# Patient Record
Sex: MALE | Race: WHITE | NOT HISPANIC OR LATINO | Employment: UNEMPLOYED | URBAN - METROPOLITAN AREA
[De-identification: names, ages, dates, MRNs, and addresses within clinical notes are randomized per-mention and may not be internally consistent; named-entity substitution may affect disease eponyms.]

---

## 2018-08-01 ENCOUNTER — OFFICE VISIT (OUTPATIENT)
Dept: URGENT CARE | Facility: URGENT CARE | Age: 23
End: 2018-08-01
Payer: COMMERCIAL

## 2018-08-01 ENCOUNTER — RADIANT APPOINTMENT (OUTPATIENT)
Dept: GENERAL RADIOLOGY | Facility: CLINIC | Age: 23
End: 2018-08-01
Attending: FAMILY MEDICINE
Payer: COMMERCIAL

## 2018-08-01 VITALS
TEMPERATURE: 98.7 F | HEART RATE: 95 BPM | SYSTOLIC BLOOD PRESSURE: 146 MMHG | RESPIRATION RATE: 18 BRPM | OXYGEN SATURATION: 97 % | DIASTOLIC BLOOD PRESSURE: 89 MMHG | WEIGHT: 315 LBS

## 2018-08-01 DIAGNOSIS — Z72.0 TOBACCO ABUSE: ICD-10-CM

## 2018-08-01 DIAGNOSIS — R06.02 SOB (SHORTNESS OF BREATH): ICD-10-CM

## 2018-08-01 DIAGNOSIS — M79.89 SWOLLEN FEET: Primary | ICD-10-CM

## 2018-08-01 DIAGNOSIS — R05.8 PRODUCTIVE COUGH: ICD-10-CM

## 2018-08-01 DIAGNOSIS — R05.9 COUGH: ICD-10-CM

## 2018-08-01 LAB
ALBUMIN SERPL-MCNC: 3.8 G/DL (ref 3.4–5)
ALP SERPL-CCNC: 64 U/L (ref 40–150)
ALT SERPL W P-5'-P-CCNC: 58 U/L (ref 0–70)
ANION GAP SERPL CALCULATED.3IONS-SCNC: 7 MMOL/L (ref 3–14)
AST SERPL W P-5'-P-CCNC: 26 U/L (ref 0–45)
BASOPHILS # BLD AUTO: 0 10E9/L (ref 0–0.2)
BASOPHILS NFR BLD AUTO: 0.2 %
BILIRUB SERPL-MCNC: 0.2 MG/DL (ref 0.2–1.3)
BUN SERPL-MCNC: 14 MG/DL (ref 7–30)
CALCIUM SERPL-MCNC: 8.7 MG/DL (ref 8.5–10.1)
CHLORIDE SERPL-SCNC: 109 MMOL/L (ref 94–109)
CO2 SERPL-SCNC: 27 MMOL/L (ref 20–32)
CREAT SERPL-MCNC: 0.9 MG/DL (ref 0.66–1.25)
DIFFERENTIAL METHOD BLD: NORMAL
EOSINOPHIL # BLD AUTO: 0.2 10E9/L (ref 0–0.7)
EOSINOPHIL NFR BLD AUTO: 1.8 %
ERYTHROCYTE [DISTWIDTH] IN BLOOD BY AUTOMATED COUNT: 13.4 % (ref 10–15)
GFR SERPL CREATININE-BSD FRML MDRD: >90 ML/MIN/1.7M2
GLUCOSE SERPL-MCNC: 115 MG/DL (ref 70–99)
HCT VFR BLD AUTO: 44.8 % (ref 40–53)
HGB BLD-MCNC: 14.7 G/DL (ref 13.3–17.7)
LYMPHOCYTES # BLD AUTO: 2.1 10E9/L (ref 0.8–5.3)
LYMPHOCYTES NFR BLD AUTO: 19.1 %
MCH RBC QN AUTO: 29.1 PG (ref 26.5–33)
MCHC RBC AUTO-ENTMCNC: 32.8 G/DL (ref 31.5–36.5)
MCV RBC AUTO: 89 FL (ref 78–100)
MONOCYTES # BLD AUTO: 0.8 10E9/L (ref 0–1.3)
MONOCYTES NFR BLD AUTO: 7.2 %
NEUTROPHILS # BLD AUTO: 7.8 10E9/L (ref 1.6–8.3)
NEUTROPHILS NFR BLD AUTO: 71.7 %
NT-PROBNP SERPL-MCNC: 10 PG/ML (ref 0–125)
PLATELET # BLD AUTO: 226 10E9/L (ref 150–450)
POTASSIUM SERPL-SCNC: 4 MMOL/L (ref 3.4–5.3)
PROT SERPL-MCNC: 7.8 G/DL (ref 6.8–8.8)
RBC # BLD AUTO: 5.06 10E12/L (ref 4.4–5.9)
SODIUM SERPL-SCNC: 143 MMOL/L (ref 133–144)
WBC # BLD AUTO: 10.9 10E9/L (ref 4–11)

## 2018-08-01 PROCEDURE — 83880 ASSAY OF NATRIURETIC PEPTIDE: CPT | Performed by: FAMILY MEDICINE

## 2018-08-01 PROCEDURE — 85025 COMPLETE CBC W/AUTO DIFF WBC: CPT | Performed by: FAMILY MEDICINE

## 2018-08-01 PROCEDURE — 99204 OFFICE O/P NEW MOD 45 MIN: CPT | Performed by: FAMILY MEDICINE

## 2018-08-01 PROCEDURE — 80053 COMPREHEN METABOLIC PANEL: CPT | Performed by: FAMILY MEDICINE

## 2018-08-01 PROCEDURE — 36415 COLL VENOUS BLD VENIPUNCTURE: CPT | Performed by: FAMILY MEDICINE

## 2018-08-01 PROCEDURE — 71046 X-RAY EXAM CHEST 2 VIEWS: CPT | Mod: FY

## 2018-08-01 RX ORDER — AZITHROMYCIN 250 MG/1
TABLET, FILM COATED ORAL
Qty: 6 TABLET | Refills: 0 | Status: SHIPPED | OUTPATIENT
Start: 2018-08-01 | End: 2018-08-06

## 2018-08-01 NOTE — MR AVS SNAPSHOT
"              After Visit Summary   2018    Adin Meade    MRN: [id number]           Patient Information     Date Of Birth          1995        Visit Information        Provider Department      2018 11:15 AM Omar Solo,  [facility]        Today's Diagnoses     Swollen feet    -  1    Cough        SOB (shortness of breath)        Productive cough        Tobacco abuse           Follow-ups after your visit        Who to contact     If you have questions or need follow up information about today's clinic visit or your schedule please contact [facility] directly at [phone].  Normal or non-critical lab and imaging results will be communicated to you by [employer], letter or phone within 4 business days after the clinic has received the results. If you do not hear from us within 7 days, please contact the clinic through [employer] or phone. If you have a critical or abnormal lab result, we will notify you by phone as soon as possible.  Submit refill requests through [employer] or call your pharmacy and they will forward the refill request to us. Please allow 3 business days for your refill to be completed.          Additional Information About Your Visit        MyChart Information     [employer] lets you send messages to your doctor, view your test results, renew your prescriptions, schedule appointments and more. To sign up, go to [email or web address] . Click on \"Log in\" on the left side of the screen, which will take you to the Welcome page. Then click on \"Sign up Now\" on the right side of the page.     You will be asked to enter the access code listed below, as well as some personal information. Please follow the directions to create your username and password.     Your access code is: [email or web address]  Expires: 10/30/2018  1:04 PM     Your access code will  in 90 days. If you need help or a new code, please call your [place] clinic or " [phone].        Care EveryWhere ID     This is your Care EveryWhere ID. This could be used by other organizations to access your [place] medical records  [id number]        Your Vitals Were     Pulse Temperature Respirations Pulse Oximetry          95 98.7  F (37.1  C) (Oral) 18 97%         Blood Pressure from Last 3 Encounters:   08/01/18 146/89    Weight from Last 3 Encounters:   08/01/18 (!) 394 lb (178.7 kg)              We Performed the Following     BNP-N terminal pro     CBC with platelets differential     Comprehensive metabolic panel (BMP + Alb, Alk Phos, ALT, AST, Total. Bili, TP)     XR Chest 2 Views          Today's Medication Changes          These changes are accurate as of 8/1/18  1:04 PM.  If you have any questions, ask your nurse or doctor.               Start taking these medicines.        Dose/Directions    azithromycin 250 MG tablet   Commonly known as:  ZITHROMAX   Used for:  Productive cough   Started by:  Omar Solo, DO        Two tablets first day, then one tablet daily for four days.   Quantity:  6 tablet   Refills:  0            Where to get your medicines      These medications were sent to [facility] [id number] [place] [address] [address] AT [facility] [address]  [address] [facility] [address]     Phone:  [phone]     azithromycin 250 MG tablet                Primary Care Provider Fax #    Physician No Ref-Primary [phone]       No address on file        Equal Access to Services     MATTY CHRISTIANSON : Hadii manuelito doss Soharjinder, waaxda luqadaha, qaybta kaalmada aderegineyada, suzan reyes. So [facility] [phone].    ATENCIÓN: Si habla español, tiene a carbajal disposición servicios gratuitos de asistencia lingüística. Nicholame al [phone].    We comply with applicable federal civil rights laws and [place] laws. We do not discriminate on the basis of race, color, national origin, age, disability, sex, sexual orientation, or  gender identity.            Thank you!     Thank you for choosing [facility]  for your care. Our goal is always to provide you with excellent care. Hearing back from our patients is one way we can continue to improve our services. Please take a few minutes to complete the written survey that you may receive in the mail after your visit with us. Thank you!             Your Updated Medication List - Protect others around you: Learn how to safely use, store and throw away your medicines at www.disposemymeds.org.          This list is accurate as of 8/1/18  1:04 PM.  Always use your most recent med list.                   Brand Name Dispense Instructions for use Diagnosis    azithromycin 250 MG tablet    ZITHROMAX    6 tablet    Two tablets first day, then one tablet daily for four days.    Productive cough

## 2018-08-01 NOTE — PROGRESS NOTES
SUBJECTIVE: Adin Meade is a 23 year old male presenting with a chief complaint of cough for weeks along with chronic lower ext edema.  Onset of symptoms was week(s) ago.  Course of illness is same.    Severity moderate  Current and Associated symptoms: SOB  Treatment measures tried include None tried.  Predisposing factors include obesity.    Past Medical History:   Diagnosis Date     Obesity        Past Surgical History:   Procedure Laterality Date     NO HISTORY OF SURGERY         Family History   Problem Relation Age of Onset     KIDNEY DISEASE Paternal Grandmother      Cancer Paternal Grandfather        Social History   Substance Use Topics     Smoking status: Current Every Day Smoker     Smokeless tobacco: Never Used     Alcohol use Yes      Comment: 1 during weekend      Review Of Systems  Skin: negative  Eyes: negative  Ears/Nose/Throat: negative  Respiratory: Cough- productive  Cardiovascular: negative  Gastrointestinal: negative  Genitourinary: negative  Musculoskeletal: negative  Neurologic: negative  Psychiatric: negative  Hematologic/Lymphatic/Immunologic: negative  Endocrine: negative      OBJECTIVE:  /89  Pulse 95  Temp 98.7  F (37.1  C) (Oral)  Resp 18  Wt (!) 394 lb (178.7 kg)  SpO2 97%GENERAL APPEARANCE: healthy, alert and no distress  EYES: EOMI,  PERRL, conjunctiva clear  HENT: ear canals and TM's normal.  Nose and mouth without ulcers, erythema or lesions  NECK: supple, nontender, no lymphadenopathy  RESP: lungs clear to auscultation - no rales, rhonchi or wheezes  CV: regular rates and rhythm, normal S1 S2, no murmur noted  ABDOMEN:  soft, nontender, no HSM or masses and bowel sounds normal  NEURO: Normal strength and tone, sensory exam grossly normal,  normal speech and mentation  SKIN: no suspicious lesions or rashes  Bilateral pitting edema lower ext      ICD-10-CM    1. Swollen feet M79.89 CBC with platelets differential     Comprehensive metabolic panel (BMP + Alb, Alk Phos,  ALT, AST, Total. Bili, TP)     BNP-N terminal pro     XR Chest 2 Views   2. Cough R05 CBC with platelets differential     Comprehensive metabolic panel (BMP + Alb, Alk Phos, ALT, AST, Total. Bili, TP)     BNP-N terminal pro     XR Chest 2 Views   3. SOB (shortness of breath) R06.02 CBC with platelets differential     Comprehensive metabolic panel (BMP + Alb, Alk Phos, ALT, AST, Total. Bili, TP)     BNP-N terminal pro     XR Chest 2 Views   4. Productive cough R05 azithromycin (ZITHROMAX) 250 MG tablet   5. Tobacco abuse Z72.0 CBC with platelets differential     Comprehensive metabolic panel (BMP + Alb, Alk Phos, ALT, AST, Total. Bili, TP)     BNP-N terminal pro     XR Chest 2 Views     cc BNP  Tests done today to r/o Pneumonia/kidney/liver issues as a cause for edema/cough/SOB as well as BNP to r/u CHF  Elevated legs, compression stockings, low salt diet wt loss  Will need recheck/f/u clinic.    Pt will call for f/u appointment today

## 2020-07-09 ENCOUNTER — OFFICE VISIT (OUTPATIENT)
Dept: FAMILY MEDICINE | Facility: CLINIC | Age: 25
End: 2020-07-09
Payer: MEDICAID

## 2020-07-09 VITALS
HEART RATE: 80 BPM | BODY MASS INDEX: 41.75 KG/M2 | DIASTOLIC BLOOD PRESSURE: 83 MMHG | SYSTOLIC BLOOD PRESSURE: 137 MMHG | OXYGEN SATURATION: 98 % | TEMPERATURE: 97.9 F | WEIGHT: 315 LBS | HEIGHT: 73 IN

## 2020-07-09 DIAGNOSIS — L72.3 SEBACEOUS CYST: Primary | ICD-10-CM

## 2020-07-09 DIAGNOSIS — L05.01 PILONIDAL CYST WITH ABSCESS: ICD-10-CM

## 2020-07-09 PROCEDURE — 10060 I&D ABSCESS SIMPLE/SINGLE: CPT | Performed by: FAMILY MEDICINE

## 2020-07-09 RX ORDER — DOXYCYCLINE 100 MG/1
100 CAPSULE ORAL 2 TIMES DAILY
Qty: 20 CAPSULE | Refills: 0 | Status: SHIPPED | OUTPATIENT
Start: 2020-07-09 | End: 2020-07-19

## 2020-07-09 ASSESSMENT — MIFFLIN-ST. JEOR: SCORE: 2727.54

## 2020-07-09 ASSESSMENT — PAIN SCALES - GENERAL: PAINLEVEL: MODERATE PAIN (4)

## 2020-07-09 NOTE — PROGRESS NOTES
Subjective     Adin Meade is a 25 year old male who presents to clinic today for the following health issues:    HPI   Patient with history of sebaceous cyst in the past.  He reports in the past 2 weeks he has been having a cyst behind his right ear inflamed tender.  Sometimes it does leak.  Otherwise no fever no chills.    Patient does have history of pilonidal cyst been present for years.  He reports sometimes does get inflamed tendon.  He would like to have it removed.    Patient is present to day for possible cyst on the back of both ears. He stated that it feels tender.    Reviewed and updated as needed this visit by Provider         Review of Systems   Constitutional, HEENT, cardiovascular, pulmonary, gi and gu systems are negative, except as otherwise noted.      Objective    There were no vitals taken for this visit.  There is no height or weight on file to calculate BMI.  Physical Exam   GENERAL: healthy, alert and no distress  MS: no gross musculoskeletal defects noted, no edema  SKIN: Behind the right ear he has a sebaceous cyst, 1 cm, by half a centimeter.  Inflamed and tender.  At his anal area he does have pilonidal cyst, inflamed and tender.    PSYCH: mentation appears normal, affect normal/bright    Diagnostic Test Results:  Labs reviewed in Epic  none         Assessment & Plan       ICD-10-CM    1. Sebaceous cyst  L72.3 doxycycline hyclate (VIBRAMYCIN) 100 MG capsule     DRAIN SKIN ABSCESS SIMPLE/SINGLE   2. Pilonidal cyst with abscess  L05.01 GENERAL SURG ADULT REFERRAL     After I obtained a consent from the patient.  The area of his right, behind ear cyst was cleaned with alcohol swab.  Then I used half a cc of 1% lidocaine.  Then a 15 inch blade made a horizontal incision.  I squeezed a small amount of bloody, greenish fluid.  Patient tolerated the procedure well.  Then covered with a pressure- guaze.    He was given doxycycline to treat his pilonidal cyst, in addition to help with  "inflammation of his sebaceous cyst.  In addition, he was referred to a general surgeon to have his pilonidal cyst completely removed.    BMI:   Estimated body mass index is 48.64 kg/m  as calculated from the following:    Height as of this encounter: 1.865 m (6' 1.43\").    Weight as of this encounter: 169.2 kg (373 lb).   Weight management plan: Discussed healthy diet and exercise guidelines        There are no Patient Instructions on file for this visit.    Return in about 6 months (around 1/9/2021) for Physical Exam.    Fernanda Segovia MD  [facility]    "

## 2020-09-08 ENCOUNTER — OFFICE VISIT (OUTPATIENT)
Dept: SURGERY | Facility: CLINIC | Age: 25
End: 2020-09-08
Payer: MEDICAID

## 2020-09-08 ENCOUNTER — TELEPHONE (OUTPATIENT)
Dept: SURGERY | Facility: CLINIC | Age: 25
End: 2020-09-08

## 2020-09-08 VITALS
SYSTOLIC BLOOD PRESSURE: 153 MMHG | HEART RATE: 70 BPM | HEIGHT: 74 IN | BODY MASS INDEX: 40.43 KG/M2 | WEIGHT: 315 LBS | DIASTOLIC BLOOD PRESSURE: 87 MMHG

## 2020-09-08 DIAGNOSIS — L05.01 PILONIDAL CYST WITH ABSCESS: ICD-10-CM

## 2020-09-08 DIAGNOSIS — L08.9 INFECTED SEBACEOUS CYST OF SKIN: Primary | ICD-10-CM

## 2020-09-08 DIAGNOSIS — L72.3 INFECTED SEBACEOUS CYST OF SKIN: Primary | ICD-10-CM

## 2020-09-08 DIAGNOSIS — E66.01 MORBID OBESITY (H): ICD-10-CM

## 2020-09-08 PROCEDURE — 99203 OFFICE O/P NEW LOW 30 MIN: CPT | Performed by: SURGERY

## 2020-09-08 RX ORDER — DOXYCYCLINE HYCLATE 100 MG
100 TABLET ORAL 2 TIMES DAILY
Qty: 20 TABLET | Refills: 1 | Status: ON HOLD | OUTPATIENT
Start: 2020-09-08 | End: 2022-01-11

## 2020-09-08 ASSESSMENT — MIFFLIN-ST. JEOR: SCORE: 2777.49

## 2020-09-08 NOTE — LETTER
"    9/8/2020         RE: Adin Meade  [address]  [facility] [facility] [address]        Dear Colleague,    Thank you for referring your patient, Adin Meade, to the [facility]. Please see a copy of my visit note below.    Dear Dr. Segovia    I have seen Adin Meade and as you know his chief complaint is pilonidal cyst that is draining   Has had for 2 years.  minimal pain today.     HPI:  Patient is a 25 year old male  with complaints see above  The patient noticed the symptoms about 2 years ago.    Patient has not family history of hernia problems  Laying on his side makes the episode better.      Review Of Systems  Respiratory: No shortness of breath, dyspnea on exertion, cough, or hemoptysis  Cardiovascular: negative  Gastrointestinal: negative  Endocrine: negative  :  negative    10 Point review of systems all others are negative.   BP (!) 153/87   Pulse 70   Ht 1.88 m (6' 2\")   Wt (!) 173.3 kg (382 lb)   BMI 49.05 kg/m      Past Medical History:   Diagnosis Date     Obesity        Past Surgical History:   Procedure Laterality Date     NO HISTORY OF SURGERY         Social History     Socioeconomic History     Marital status: Single     Spouse name: Not on file     Number of children: Not on file     Years of education: Not on file     Highest education level: Not on file   Occupational History     Not on file   Social Needs     Financial resource strain: Not on file     Food insecurity     Worry: Not on file     Inability: Not on file     Transportation needs     Medical: Not on file     Non-medical: Not on file   Tobacco Use     Smoking status: Current Every Day Smoker     Smokeless tobacco: Never Used   Substance and Sexual Activity     Alcohol use: Yes     Comment: 1 during weekend      Drug use: Yes     Types: Marijuana     Comment: sometimes      Sexual activity: Yes     Partners: Female   Lifestyle     Physical activity     Days per week: Not on file     Minutes per session: Not on " "file     Stress: Not on file   Relationships     Social connections     Talks on phone: Not on file     Gets together: Not on file     Attends [employer] service: Not on file     Active member of club or organization: Not on file     Attends meetings of clubs or organizations: Not on file     Relationship status: Not on file     Intimate partner violence     Fear of current or ex partner: Not on file     Emotionally abused: Not on file     Physically abused: Not on file     Forced sexual activity: Not on file   Other Topics Concern     Parent/sibling w/ CABG, MI or angioplasty before 65F 55M? Not Asked   Social History Narrative     Not on file       No current outpatient medications on file.       Above was reviewed  Physical exam: BP (!) 153/87   Pulse 70   Ht 1.88 m (6' 2\")   Wt (!) 173.3 kg (382 lb)   BMI 49.05 kg/m     Patient able to get up on table without difficulty.   Patient is alert and orientated.   Head eyes, nose and mouth within normal limits.  No supraclavicular or cervical adenopathy palpated.  Thyroid within normal limits.  No carotid bruits auscultated.  Lungs are clear to auscultation  Heart is regular rate and rhythm with no murmur or thrills noted.  No costal vertebral angle tenderness noted.  Abdomen is abdomen is soft without significant tenderness, masses, organomegaly or guarding  bowel sounds are positive and no caput medusa noted.  No obvious ventra hernias noted.     Skin was warm and pink  Normal Affect      Lower extremity edema is mildly present.    On the pilonidal cyst area has multiple small pits starting below the the abscess site that is on the left side.     Assessment: pilonidal cyst with tracts   Plan to do will under  General anesthesia  Explore the area with small wires and then remove the pilonidal cyst with tracts and leave open.  .    Risks of surgery include damage to nerves, bleeding, infection, damage to  Vessels, recurrence. Will be left open.     Look on groupon " for laser hair removal.  To be done after this is all healed up.     Time spent with the patient with greater that 50% of the time in discussion was 30 minutes.  In discussing the surgery and after care. .      Tulio Alvarado MD        Again, thank you for allowing me to participate in the care of your patient.        Sincerely,        Tulio Alvarado MD

## 2020-09-08 NOTE — TELEPHONE ENCOUNTER
Left a voice message for patient to call and schedule surgery, please confirm patients insurance     Confirmed surgery date with Richa GUEVARA for 10/05/2020

## 2020-09-08 NOTE — PATIENT INSTRUCTIONS
On the pilonidal cyst area has multiple small pits starting below the the abscess site that is on the left side.     Assessment: pilonidal cyst with tracts   Plan to do will under  General anesthesia  Explore the area with small wires and then remove the pilonidal cyst with tracts and leave open.  .    Risks of surgery include damage to nerves, bleeding, infection, damage to  Vessels, recurrence. Will be left open.     Look on groupon for laser hair removal.  To be done after this is all healed up.       HERNIORRHAPHY DISCHARGE INSTRUCTIONS  DR. BELLE FERGUSON    1. You may resume your regular diet when you feel you are ready to. DO NOT drink alcoholic beverages for 24 hours or while you are taking prescription medication.    2. Limit your activities for the first 48 hours. Gradually, increase them as tolerated. You may use stairs. I encourage you to walk as tolerated.     3. You will have some discomfort at the incision sites. This is expected. This should improve over the next 2-3 days. Ice and pain medication will help with this pain. Use prescribed pain medication as instructed.    4. Bruising and mild swelling is normal after surgery. For males it is common to have bruising going into the penis and scrotum. The area below and around the incision(s) will be hard and elevated. This is normal. I call it the healing ridge. This will resolve slowly over the next several months. If you feel the pain is increasing and cannot explain it by increasing activity please call us at [phone].    5. The dressing will often have some blood on it. You may shower 24 hours after surgery. Clean gently over incision site. If clear plastic covering or steri-strip comes off and there is still some bleeding or drainage then cover with gauze or band-aid. If no bleeding, there is no reason to cover site. The abdominal binder may be removed after 24 hours after surgery. You may continue to wear it however for comfort. I  suggest  you wear an old t-shirt under the abdominal binder for a more comfortable wear.    6. Avoid Aspirin for the first 72 hours after the procedure. This medication may increase the tendency to bleed.    7. Use the following medications (in addition to your normal meds) as shown:  a. Percocet 5 mg 1-2 every 6 hours as needed for severe pain. This contains 325 mg of Tylenol (acetaminophen) per tablet.  Please do not take more than 4 grams of Tylenol (acetaminophen) per day. For example, you may take 1 Percocet and 1 Tylenol, or 2 Percocet and no Tylenol, or 2 Tylenol and no Percocet every 6 hours.  b. Tylenol (acetaminophen) 500 mg every 6 hours as needed for mild pain. Do not take more than 1000 mg every 6 hours. (see above).  c. Motrin (ibuprofen) 200-800 mg every 6 hours as needed for mild to moderate pain. Take with food.     8. Notify Dr. Alvarado's clinic at [phone] if:    Your discomfort is not relieved by your pain medication.    You have signs of infection such as temperature above 100.5 degrees orally, chills, or increasing daily discomfort.    Incision site is becoming more red and/or there is purulent drainage.    You have questions or concerns.    9. Please call [phone] to schedule a follow up appointment in about 2 weeks.    10. When taking narcotics (pain medication more than Tylenol [acetaminophen] and Motrin [ibuprofen]) it is important to keep your stools soft to avoid constipation and pain with straining. This is best done by drinking fluids (non-alcoholic and non-caffeinated) and taking a stool softener (i.e. Metamucil or milk of magnesia). You may be able to use non-narcotics for pain relief especially by the 3rd post- operative day. Tylenol (acetaminophen) 500 mg every 6 hours and/or Motrin (ibuprofen) 200-800 mg every 6 hours. Please do not take more than 4 grams of Tylenol (acetaminophen) per day. Remember your Percocet does have Tylenol (acetaminophen) already in it. Please  take Motrin (ibuprofen) with food to help protect the stomach. If you have a history of stomach ulcers or stomach problems, do not take Motrin (ibuprofen).     11. Do not drive or operate heavy machinery for 24 hours after surgery or when taking narcotics. You may resume driving when feel that you can safely avoid an accident and are not taking narcotics. This is usually 5 to 7 days after surgery. You should not be alone for 24 hours after surgery.    12. Have milk of magnesia available at home so that when you take the pain medications you take 1-2 teaspoons a day,  to help reduce problems with constipation.

## 2020-09-08 NOTE — PROGRESS NOTES
"Dear Dr. Segovia    I have seen Adin DIETER Lawsnd and as you know his chief complaint is pilonidal cyst that is draining   Has had for 2 years.  minimal pain today.     HPI:  Patient is a 25 year old male  with complaints see above  The patient noticed the symptoms about 2 years ago.    Patient has not family history of hernia problems  Laying on his side makes the episode better.      Review Of Systems  Respiratory: No shortness of breath, dyspnea on exertion, cough, or hemoptysis  Cardiovascular: negative  Gastrointestinal: negative  Endocrine: negative  :  negative    10 Point review of systems all others are negative.   BP (!) 153/87   Pulse 70   Ht 1.88 m (6' 2\")   Wt (!) 173.3 kg (382 lb)   BMI 49.05 kg/m      Past Medical History:   Diagnosis Date     Obesity        Past Surgical History:   Procedure Laterality Date     NO HISTORY OF SURGERY         Social History     Socioeconomic History     Marital status: Single     Spouse name: Not on file     Number of children: Not on file     Years of education: Not on file     Highest education level: Not on file   Occupational History     Not on file   Social Needs     Financial resource strain: Not on file     Food insecurity     Worry: Not on file     Inability: Not on file     Transportation needs     Medical: Not on file     Non-medical: Not on file   Tobacco Use     Smoking status: Current Every Day Smoker     Smokeless tobacco: Never Used   Substance and Sexual Activity     Alcohol use: Yes     Comment: 1 during weekend      Drug use: Yes     Types: Marijuana     Comment: sometimes      Sexual activity: Yes     Partners: Female   Lifestyle     Physical activity     Days per week: Not on file     Minutes per session: Not on file     Stress: Not on file   Relationships     Social connections     Talks on phone: Not on file     Gets together: Not on file     Attends Church service: Not on file     Active member of club or organization: Not on file     Attends " "meetings of clubs or organizations: Not on file     Relationship status: Not on file     Intimate partner violence     Fear of current or ex partner: Not on file     Emotionally abused: Not on file     Physically abused: Not on file     Forced sexual activity: Not on file   Other Topics Concern     Parent/sibling w/ CABG, MI or angioplasty before 65F 55M? Not Asked   Social History Narrative     Not on file       No current outpatient medications on file.       Above was reviewed  Physical exam: BP (!) 153/87   Pulse 70   Ht 1.88 m (6' 2\")   Wt (!) 173.3 kg (382 lb)   BMI 49.05 kg/m     Patient able to get up on table without difficulty.   Patient is alert and orientated.   Head eyes, nose and mouth within normal limits.  No supraclavicular or cervical adenopathy palpated.  Thyroid within normal limits.  No carotid bruits auscultated.  Lungs are clear to auscultation  Heart is regular rate and rhythm with no murmur or thrills noted.  No costal vertebral angle tenderness noted.  Abdomen is abdomen is soft without significant tenderness, masses, organomegaly or guarding  bowel sounds are positive and no caput medusa noted.  No obvious ventra hernias noted.     Skin was warm and pink  Normal Affect      Lower extremity edema is mildly present.    On the pilonidal cyst area has multiple small pits starting below the the abscess site that is on the left side.     Assessment: pilonidal cyst with tracts   Plan to do will under  General anesthesia  Explore the area with small wires and then remove the pilonidal cyst with tracts and leave open.  .    Risks of surgery include damage to nerves, bleeding, infection, damage to  Vessels, recurrence. Will be left open.     Look on groupon for laser hair removal.  To be done after this is all healed up.     Time spent with the patient with greater that 50% of the time in discussion was 30 minutes.  In discussing the surgery and after care. .      Tulio Alvarado MD      "

## 2020-09-10 NOTE — TELEPHONE ENCOUNTER
Spoke with patient he is wanting to wait to schedule until December, I have canceled with [facility].

## 2020-12-06 ENCOUNTER — HEALTH MAINTENANCE LETTER (OUTPATIENT)
Age: 25
End: 2020-12-06

## 2021-05-05 ENCOUNTER — HOSPITAL ENCOUNTER (EMERGENCY)
Facility: CLINIC | Age: 26
Discharge: HOME | End: 2021-05-06
Attending: EMERGENCY MEDICINE
Payer: COMMERCIAL

## 2021-05-05 DIAGNOSIS — H11.31 SUBCONJUNCTIVAL HEMORRHAGE OF RIGHT EYE: ICD-10-CM

## 2021-05-05 DIAGNOSIS — S00.03XA CONTUSION OF FACE, SCALP AND NECK, INITIAL ENCOUNTER: ICD-10-CM

## 2021-05-05 DIAGNOSIS — G44.209 TENSION HEADACHE: ICD-10-CM

## 2021-05-05 DIAGNOSIS — S62.632A CLOSED DISPLACED FRACTURE OF DISTAL PHALANX OF RIGHT MIDDLE FINGER, INITIAL ENCOUNTER: ICD-10-CM

## 2021-05-05 DIAGNOSIS — F07.81 POST CONCUSSIVE SYNDROME: ICD-10-CM

## 2021-05-05 DIAGNOSIS — S60.221A CONTUSION OF RIGHT HAND, INITIAL ENCOUNTER: ICD-10-CM

## 2021-05-05 DIAGNOSIS — S00.83XA CONTUSION OF FACE, SCALP AND NECK, INITIAL ENCOUNTER: ICD-10-CM

## 2021-05-05 DIAGNOSIS — S10.93XA CONTUSION OF FACE, SCALP AND NECK, INITIAL ENCOUNTER: ICD-10-CM

## 2021-05-05 PROCEDURE — 250N000011 HC RX IP 250 OP 636: Performed by: EMERGENCY MEDICINE

## 2021-05-05 PROCEDURE — 99285 EMERGENCY DEPT VISIT HI MDM: CPT | Mod: 25

## 2021-05-05 RX ORDER — ONDANSETRON 4 MG/1
4 TABLET, ORALLY DISINTEGRATING ORAL ONCE
Status: COMPLETED | OUTPATIENT
Start: 2021-05-05 | End: 2021-05-05

## 2021-05-05 RX ADMIN — ONDANSETRON 4 MG: 4 TABLET, ORALLY DISINTEGRATING ORAL at 23:48

## 2021-05-05 ASSESSMENT — ENCOUNTER SYMPTOMS
DIZZINESS: 1
NAUSEA: 1
HEADACHES: 1
VOMITING: 0
WOUND: 1

## 2021-05-05 ASSESSMENT — MIFFLIN-ST. JEOR: SCORE: 2831.46

## 2021-05-06 ENCOUNTER — APPOINTMENT (OUTPATIENT)
Dept: GENERAL RADIOLOGY | Facility: CLINIC | Age: 26
End: 2021-05-06
Attending: EMERGENCY MEDICINE
Payer: COMMERCIAL

## 2021-05-06 ENCOUNTER — APPOINTMENT (OUTPATIENT)
Dept: CT IMAGING | Facility: CLINIC | Age: 26
End: 2021-05-06
Attending: EMERGENCY MEDICINE
Payer: COMMERCIAL

## 2021-05-06 VITALS
HEIGHT: 74 IN | RESPIRATION RATE: 20 BRPM | HEART RATE: 90 BPM | OXYGEN SATURATION: 99 % | SYSTOLIC BLOOD PRESSURE: 145 MMHG | WEIGHT: 315 LBS | BODY MASS INDEX: 40.43 KG/M2 | DIASTOLIC BLOOD PRESSURE: 86 MMHG | TEMPERATURE: 98.3 F

## 2021-05-06 PROCEDURE — 70486 CT MAXILLOFACIAL W/O DYE: CPT

## 2021-05-06 PROCEDURE — 90715 TDAP VACCINE 7 YRS/> IM: CPT | Performed by: EMERGENCY MEDICINE

## 2021-05-06 PROCEDURE — 73110 X-RAY EXAM OF WRIST: CPT | Mod: RT

## 2021-05-06 PROCEDURE — 73130 X-RAY EXAM OF HAND: CPT | Mod: RT

## 2021-05-06 PROCEDURE — 90471 IMMUNIZATION ADMIN: CPT | Performed by: EMERGENCY MEDICINE

## 2021-05-06 PROCEDURE — 70450 CT HEAD/BRAIN W/O DYE: CPT

## 2021-05-06 PROCEDURE — 250N000011 HC RX IP 250 OP 636: Performed by: EMERGENCY MEDICINE

## 2021-05-06 RX ORDER — ONDANSETRON 4 MG/1
4 TABLET, ORALLY DISINTEGRATING ORAL EVERY 8 HOURS PRN
Qty: 10 TABLET | Refills: 0 | Status: SHIPPED | OUTPATIENT
Start: 2021-05-06 | End: 2021-05-09

## 2021-05-06 RX ADMIN — CLOSTRIDIUM TETANI TOXOID ANTIGEN (FORMALDEHYDE INACTIVATED), CORYNEBACTERIUM DIPHTHERIAE TOXOID ANTIGEN (FORMALDEHYDE INACTIVATED), BORDETELLA PERTUSSIS TOXOID ANTIGEN (GLUTARALDEHYDE INACTIVATED), BORDETELLA PERTUSSIS FILAMENTOUS HEMAGGLUTININ ANTIGEN (FORMALDEHYDE INACTIVATED), BORDETELLA PERTUSSIS PERTACTIN ANTIGEN, AND BORDETELLA PERTUSSIS FIMBRIAE 2/3 ANTIGEN 0.5 ML: 5; 2; 2.5; 5; 3; 5 INJECTION, SUSPENSION INTRAMUSCULAR at 01:32
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
## 2021-09-25 ENCOUNTER — HEALTH MAINTENANCE LETTER (OUTPATIENT)
Age: 26
End: 2021-09-25

## 2021-12-12 ENCOUNTER — HOSPITAL ENCOUNTER (EMERGENCY)
Facility: CLINIC | Age: 26
Discharge: HOME | End: 2021-12-12
Attending: NURSE PRACTITIONER | Admitting: NURSE PRACTITIONER
Payer: COMMERCIAL

## 2021-12-12 VITALS
OXYGEN SATURATION: 94 % | HEART RATE: 104 BPM | TEMPERATURE: 97.6 F | RESPIRATION RATE: 20 BRPM | SYSTOLIC BLOOD PRESSURE: 152 MMHG | DIASTOLIC BLOOD PRESSURE: 82 MMHG

## 2021-12-12 DIAGNOSIS — L05.91 PILONIDAL CYST: ICD-10-CM

## 2021-12-12 PROCEDURE — 99282 EMERGENCY DEPT VISIT SF MDM: CPT

## 2021-12-12 RX ORDER — DOXYCYCLINE 100 MG/1
100 CAPSULE ORAL 2 TIMES DAILY
Qty: 20 CAPSULE | Refills: 0 | Status: SHIPPED | OUTPATIENT
Start: 2021-12-12 | End: 2021-12-22

## 2021-12-12 ASSESSMENT — ENCOUNTER SYMPTOMS
CHILLS: 0
DYSURIA: 0
ARTHRALGIAS: 0
DIZZINESS: 0
VOMITING: 0
ABDOMINAL PAIN: 0
MYALGIAS: 0
SHORTNESS OF BREATH: 0
CONFUSION: 0
HEADACHES: 0
DIARRHEA: 0
CHEST TIGHTNESS: 0
WOUND: 1
BACK PAIN: 0
FATIGUE: 0
CONSTIPATION: 0
FEVER: 0

## 2021-12-12 NOTE — ED TRIAGE NOTES
Cyst on bottocks, ongoing x3 years without needing intervention. Now very painful, getting worse x2-3 days. No fever, chills or body aches.

## 2021-12-12 NOTE — ED PROVIDER NOTES
History     Chief Complaint:  Wound Check       HPI   Adin Meade is a 26 year old male who presents for evaluation of painful pilonidal cyst.  The patient has had this cyst for approximately 3 years, intermittently draining.  He was seen by Dr Tulio Garvin, surgery, [facility] in September 2020 and was scheduled for surgery.  Patient was started on doxycycline at that time.  Surgery was canceled as his symptoms improved.  The patient is not currently on any antibiotics.  He notes over the last 2 to 3 days the area is getting more painful and has started to drain.  He denies fever, chills, body aches. He has been able to have bowel movements without pain. He has done no treatment to the area.    ROS:  Review of Systems   Constitutional: Negative for chills, fatigue and fever.   HENT: Negative.    Respiratory: Negative for chest tightness and shortness of breath.    Cardiovascular: Negative for chest pain.   Gastrointestinal: Negative for abdominal pain, constipation, diarrhea and vomiting.   Genitourinary: Negative for dysuria, scrotal swelling and testicular pain.   Musculoskeletal: Negative for arthralgias, back pain and myalgias.   Skin: Positive for wound. Negative for rash.   Neurological: Negative for dizziness and headaches.   Psychiatric/Behavioral: Negative for confusion.   All other systems reviewed and are negative.     Allergies:  No Known Allergies     Medications:    none    Past Medical History:    Past Medical History:   Diagnosis Date     Obesity      Patient Active Problem List   Diagnosis     Morbid obesity (H)        Past Surgical History:    Past Surgical History:   Procedure Laterality Date     NO HISTORY OF SURGERY          Family History:    family history includes Cancer in his paternal grandfather; Kidney Disease in his paternal grandmother.    Social History:   reports that he has been smoking. He has never used smokeless tobacco. He reports current  alcohol use. He reports current drug use. Drug: Marijuana.  PCP: No Ref-Primary, Physician     Physical Exam     Patient Vitals for the past 24 hrs:   BP Temp Temp src Pulse Resp SpO2   12/12/21 1259 (!) 152/82 97.6  F (36.4  C) Temporal 104 20 94 %        Physical Exam  Nursing notes reviewed. Vitals reviewed.  General: Alert. Well kept.  Eyes:  Conjunctiva non-injected, non-icteric.  Neck/Throat: Moist mucous membranes. Normal voice.  Cardiac: Regular rhythm. Normal heart sounds.  Pulmonary: Clear and equal breath sounds bilaterally.   Abdomen: Soft and nontender  : Thick yellowish drainage from the area of pilonidal cyst draining at the gluteal crest. Normal rectal tone without tenderness around the rectum. No induration or erythema.  Musculoskeletal: Normal gross range of motion of all 4 extremities.   Neurological: Alert and oriented x4.   Skin: Warm and dry.   Psych: Affect normal. Good eye contact.    Emergency Department Course     Emergency Department Course:    Reviewed:  I reviewed nursing notes, vitals, past medical history and Care Everywhere    Assessments:   I obtained history and examined the patient as noted above.     Disposition:  The patient was discharged to home.     Impression & Plan    Covid-19  Adin Meade was evaluated during a global COVID-19 pandemic, which necessitated consideration that the patient might be at risk for infection with the SARS-CoV-2 virus that causes COVID-19.   Applicable protocols for evaluation were followed during the patient's care.     Medical Decision Making:  Adin Meade is a 26 year old male who presents for evaluation of painful pilonidal cyst.   Exam findings are consistent with draining pilonidal cyst.   I discussed incision and drainage with the patient, and as he has been draining he declined I&D at this time and is requesting antibiotics only. He has no systemic symptoms and has improved previously with antibiotics. Exam is not consistent with  perirectal abscess. He has previously seen surgery for this and will contact them again tomorrow. He was also given a referral for [facility]. There is no surrounding cellulitis or induration. He will follow-up with surgery tomorrow and he should return sooner if he develops any rectal pain, rapidly spreading redness, fevers, or any other new or worsening symptoms.    Diagnosis:    ICD-10-CM    1. Pilonidal cyst  L05.91         Discharge Medications:  New Prescriptions    DOXYCYCLINE HYCLATE (VIBRAMYCIN) 100 MG CAPSULE    Take 1 capsule (100 mg) by mouth 2 times daily for 10 days        12/12/2021   Kulpmont, Shannon, CNP        Kulpmont, Shannon, CNP  12/12/21 8733

## 2021-12-30 ENCOUNTER — NURSE TRIAGE (OUTPATIENT)
Dept: NURSING | Facility: CLINIC | Age: 26
End: 2021-12-30
Payer: COMMERCIAL

## 2021-12-31 ENCOUNTER — APPOINTMENT (OUTPATIENT)
Dept: CARDIOLOGY | Facility: CLINIC | Age: 26
End: 2021-12-31
Attending: INTERNAL MEDICINE
Payer: COMMERCIAL

## 2021-12-31 ENCOUNTER — APPOINTMENT (OUTPATIENT)
Dept: CT IMAGING | Facility: CLINIC | Age: 26
End: 2021-12-31
Attending: EMERGENCY MEDICINE
Payer: COMMERCIAL

## 2021-12-31 ENCOUNTER — HOSPITAL ENCOUNTER (INPATIENT)
Facility: CLINIC | Age: 26
LOS: 4 days | Discharge: HOME | End: 2022-01-04
Attending: EMERGENCY MEDICINE | Admitting: INTERNAL MEDICINE
Payer: COMMERCIAL

## 2021-12-31 DIAGNOSIS — R79.89 ELEVATED TROPONIN: ICD-10-CM

## 2021-12-31 DIAGNOSIS — I24.89 DEMAND ISCHEMIA (H): ICD-10-CM

## 2021-12-31 DIAGNOSIS — U07.1 INFECTION DUE TO 2019 NOVEL CORONAVIRUS: ICD-10-CM

## 2021-12-31 DIAGNOSIS — J12.82 PNEUMONIA DUE TO 2019 NOVEL CORONAVIRUS: ICD-10-CM

## 2021-12-31 DIAGNOSIS — U07.1 PNEUMONIA DUE TO 2019 NOVEL CORONAVIRUS: ICD-10-CM

## 2021-12-31 LAB
ALBUMIN SERPL-MCNC: 3.5 G/DL (ref 3.4–5)
ALP SERPL-CCNC: 47 U/L (ref 40–150)
ALT SERPL W P-5'-P-CCNC: 48 U/L (ref 0–70)
ANION GAP SERPL CALCULATED.3IONS-SCNC: 5 MMOL/L (ref 3–14)
ANION GAP SERPL CALCULATED.3IONS-SCNC: 6 MMOL/L (ref 3–14)
AST SERPL W P-5'-P-CCNC: 40 U/L (ref 0–45)
ATRIAL RATE - MUSE: 91 BPM
BASOPHILS # BLD AUTO: 0 10E3/UL (ref 0–0.2)
BASOPHILS NFR BLD AUTO: 0 %
BILIRUB SERPL-MCNC: 0.4 MG/DL (ref 0.2–1.3)
BUN SERPL-MCNC: 10 MG/DL (ref 7–30)
BUN SERPL-MCNC: 10 MG/DL (ref 7–30)
CALCIUM SERPL-MCNC: 8.4 MG/DL (ref 8.5–10.1)
CALCIUM SERPL-MCNC: 8.4 MG/DL (ref 8.5–10.1)
CHLORIDE BLD-SCNC: 106 MMOL/L (ref 94–109)
CHLORIDE BLD-SCNC: 108 MMOL/L (ref 94–109)
CO2 SERPL-SCNC: 21 MMOL/L (ref 20–32)
CO2 SERPL-SCNC: 23 MMOL/L (ref 20–32)
CREAT SERPL-MCNC: 0.88 MG/DL (ref 0.66–1.25)
CREAT SERPL-MCNC: 0.89 MG/DL (ref 0.66–1.25)
CRP SERPL-MCNC: 55.9 MG/L (ref 0–8)
D DIMER PPP FEU-MCNC: 0.92 UG/ML FEU (ref 0–0.5)
DIASTOLIC BLOOD PRESSURE - MUSE: NORMAL MMHG
EOSINOPHIL # BLD AUTO: 0 10E3/UL (ref 0–0.7)
EOSINOPHIL NFR BLD AUTO: 0 %
ERYTHROCYTE [DISTWIDTH] IN BLOOD BY AUTOMATED COUNT: 12.6 % (ref 10–15)
FLUAV RNA SPEC QL NAA+PROBE: NEGATIVE
FLUBV RNA RESP QL NAA+PROBE: NEGATIVE
GFR SERPL CREATININE-BSD FRML MDRD: >90 ML/MIN/1.73M2
GFR SERPL CREATININE-BSD FRML MDRD: >90 ML/MIN/1.73M2
GLUCOSE BLD-MCNC: 111 MG/DL (ref 70–99)
GLUCOSE BLD-MCNC: 113 MG/DL (ref 70–99)
HCT VFR BLD AUTO: 42.6 % (ref 40–53)
HGB BLD-MCNC: 13.9 G/DL (ref 13.3–17.7)
IMM GRANULOCYTES # BLD: 0 10E3/UL
IMM GRANULOCYTES NFR BLD: 0 %
INTERPRETATION ECG - MUSE: NORMAL
LDH SERPL L TO P-CCNC: 277 U/L (ref 85–227)
LVEF ECHO: NORMAL
LYMPHOCYTES # BLD AUTO: 1.1 10E3/UL (ref 0.8–5.3)
LYMPHOCYTES NFR BLD AUTO: 20 %
MCH RBC QN AUTO: 28 PG (ref 26.5–33)
MCHC RBC AUTO-ENTMCNC: 32.6 G/DL (ref 31.5–36.5)
MCV RBC AUTO: 86 FL (ref 78–100)
MONOCYTES # BLD AUTO: 0.3 10E3/UL (ref 0–1.3)
MONOCYTES NFR BLD AUTO: 5 %
NEUTROPHILS # BLD AUTO: 4.2 10E3/UL (ref 1.6–8.3)
NEUTROPHILS NFR BLD AUTO: 75 %
NRBC # BLD AUTO: 0 10E3/UL
NRBC BLD AUTO-RTO: 0 /100
P AXIS - MUSE: 15 DEGREES
PLATELET # BLD AUTO: 139 10E3/UL (ref 150–450)
POTASSIUM BLD-SCNC: 4 MMOL/L (ref 3.4–5.3)
POTASSIUM BLD-SCNC: 4.1 MMOL/L (ref 3.4–5.3)
PR INTERVAL - MUSE: 142 MS
PROT SERPL-MCNC: 7.6 G/DL (ref 6.8–8.8)
QRS DURATION - MUSE: 92 MS
QT - MUSE: 334 MS
QTC - MUSE: 410 MS
R AXIS - MUSE: 38 DEGREES
RBC # BLD AUTO: 4.96 10E6/UL (ref 4.4–5.9)
SARS-COV-2 RNA RESP QL NAA+PROBE: POSITIVE
SODIUM SERPL-SCNC: 134 MMOL/L (ref 133–144)
SODIUM SERPL-SCNC: 135 MMOL/L (ref 133–144)
SYSTOLIC BLOOD PRESSURE - MUSE: NORMAL MMHG
T AXIS - MUSE: 10 DEGREES
TROPONIN I SERPL HS-MCNC: 101 NG/L
TROPONIN I SERPL HS-MCNC: 91 NG/L
TROPONIN I SERPL HS-MCNC: 94 NG/L
TSH SERPL DL<=0.005 MIU/L-ACNC: 2.39 MU/L (ref 0.4–4)
VENTRICULAR RATE- MUSE: 91 BPM
WBC # BLD AUTO: 5.5 10E3/UL (ref 4–11)

## 2021-12-31 PROCEDURE — 85379 FIBRIN DEGRADATION QUANT: CPT | Performed by: EMERGENCY MEDICINE

## 2021-12-31 PROCEDURE — 84484 ASSAY OF TROPONIN QUANT: CPT | Performed by: INTERNAL MEDICINE

## 2021-12-31 PROCEDURE — 86140 C-REACTIVE PROTEIN: CPT | Performed by: INTERNAL MEDICINE

## 2021-12-31 PROCEDURE — 258N000003 HC RX IP 258 OP 636: Performed by: HOSPITALIST

## 2021-12-31 PROCEDURE — 250N000011 HC RX IP 250 OP 636: Performed by: EMERGENCY MEDICINE

## 2021-12-31 PROCEDURE — 80053 COMPREHEN METABOLIC PANEL: CPT | Performed by: EMERGENCY MEDICINE

## 2021-12-31 PROCEDURE — 36415 COLL VENOUS BLD VENIPUNCTURE: CPT | Performed by: INTERNAL MEDICINE

## 2021-12-31 PROCEDURE — 93005 ELECTROCARDIOGRAM TRACING: CPT

## 2021-12-31 PROCEDURE — C9803 HOPD COVID-19 SPEC COLLECT: HCPCS

## 2021-12-31 PROCEDURE — 250N000013 HC RX MED GY IP 250 OP 250 PS 637: Performed by: EMERGENCY MEDICINE

## 2021-12-31 PROCEDURE — 83615 LACTATE (LD) (LDH) ENZYME: CPT | Performed by: INTERNAL MEDICINE

## 2021-12-31 PROCEDURE — 71275 CT ANGIOGRAPHY CHEST: CPT

## 2021-12-31 PROCEDURE — 96361 HYDRATE IV INFUSION ADD-ON: CPT

## 2021-12-31 PROCEDURE — 93325 DOPPLER ECHO COLOR FLOW MAPG: CPT | Mod: 26 | Performed by: INTERNAL MEDICINE

## 2021-12-31 PROCEDURE — 250N000013 HC RX MED GY IP 250 OP 250 PS 637: Performed by: INTERNAL MEDICINE

## 2021-12-31 PROCEDURE — 250N000009 HC RX 250: Performed by: HOSPITALIST

## 2021-12-31 PROCEDURE — 99285 EMERGENCY DEPT VISIT HI MDM: CPT | Mod: 25

## 2021-12-31 PROCEDURE — 84484 ASSAY OF TROPONIN QUANT: CPT | Performed by: EMERGENCY MEDICINE

## 2021-12-31 PROCEDURE — 84443 ASSAY THYROID STIM HORMONE: CPT | Performed by: EMERGENCY MEDICINE

## 2021-12-31 PROCEDURE — 250N000009 HC RX 250: Performed by: EMERGENCY MEDICINE

## 2021-12-31 PROCEDURE — 120N000001 HC R&B MED SURG/OB

## 2021-12-31 PROCEDURE — 93308 TTE F-UP OR LMTD: CPT | Mod: 26 | Performed by: INTERNAL MEDICINE

## 2021-12-31 PROCEDURE — 85025 COMPLETE CBC W/AUTO DIFF WBC: CPT | Performed by: EMERGENCY MEDICINE

## 2021-12-31 PROCEDURE — 99223 1ST HOSP IP/OBS HIGH 75: CPT | Mod: AI | Performed by: INTERNAL MEDICINE

## 2021-12-31 PROCEDURE — 93325 DOPPLER ECHO COLOR FLOW MAPG: CPT

## 2021-12-31 PROCEDURE — 258N000003 HC RX IP 258 OP 636: Performed by: EMERGENCY MEDICINE

## 2021-12-31 PROCEDURE — 87636 SARSCOV2 & INF A&B AMP PRB: CPT | Performed by: EMERGENCY MEDICINE

## 2021-12-31 PROCEDURE — 36415 COLL VENOUS BLD VENIPUNCTURE: CPT | Performed by: EMERGENCY MEDICINE

## 2021-12-31 PROCEDURE — 94640 AIRWAY INHALATION TREATMENT: CPT

## 2021-12-31 PROCEDURE — 255N000002 HC RX 255 OP 636: Performed by: INTERNAL MEDICINE

## 2021-12-31 PROCEDURE — 93321 DOPPLER ECHO F-UP/LMTD STD: CPT | Mod: 26 | Performed by: INTERNAL MEDICINE

## 2021-12-31 PROCEDURE — 250N000011 HC RX IP 250 OP 636: Performed by: INTERNAL MEDICINE

## 2021-12-31 PROCEDURE — 96374 THER/PROPH/DIAG INJ IV PUSH: CPT

## 2021-12-31 RX ORDER — ASPIRIN 81 MG/1
324 TABLET, CHEWABLE ORAL ONCE
Status: DISCONTINUED | OUTPATIENT
Start: 2021-12-31 | End: 2021-12-31

## 2021-12-31 RX ORDER — AMOXICILLIN 250 MG
1 CAPSULE ORAL 2 TIMES DAILY PRN
Status: DISCONTINUED | OUTPATIENT
Start: 2021-12-31 | End: 2022-01-04 | Stop reason: HOSPADM

## 2021-12-31 RX ORDER — IOPAMIDOL 755 MG/ML
83 INJECTION, SOLUTION INTRAVASCULAR ONCE
Status: COMPLETED | OUTPATIENT
Start: 2021-12-31 | End: 2021-12-31

## 2021-12-31 RX ORDER — IBUPROFEN 600 MG/1
600 TABLET, FILM COATED ORAL EVERY 6 HOURS PRN
Status: DISCONTINUED | OUTPATIENT
Start: 2021-12-31 | End: 2022-01-03

## 2021-12-31 RX ORDER — OMEGA-3 FATTY ACIDS/FISH OIL 300-1000MG
600-800 CAPSULE ORAL EVERY 6 HOURS PRN
Status: ON HOLD | COMMUNITY
End: 2022-01-11

## 2021-12-31 RX ORDER — ACETAMINOPHEN 325 MG/1
650 TABLET ORAL EVERY 6 HOURS PRN
Status: DISCONTINUED | OUTPATIENT
Start: 2021-12-31 | End: 2022-01-01

## 2021-12-31 RX ORDER — ASPIRIN 81 MG/1
81 TABLET, CHEWABLE ORAL DAILY
Status: DISCONTINUED | OUTPATIENT
Start: 2022-01-01 | End: 2022-01-03

## 2021-12-31 RX ORDER — ACETAMINOPHEN 650 MG/1
650 SUPPOSITORY RECTAL EVERY 6 HOURS PRN
Status: DISCONTINUED | OUTPATIENT
Start: 2021-12-31 | End: 2022-01-01

## 2021-12-31 RX ORDER — ALBUTEROL SULFATE 90 UG/1
2 AEROSOL, METERED RESPIRATORY (INHALATION) EVERY 4 HOURS PRN
Status: DISCONTINUED | OUTPATIENT
Start: 2021-12-31 | End: 2022-01-04 | Stop reason: HOSPADM

## 2021-12-31 RX ORDER — ONDANSETRON 2 MG/ML
4 INJECTION INTRAMUSCULAR; INTRAVENOUS EVERY 6 HOURS PRN
Status: DISCONTINUED | OUTPATIENT
Start: 2021-12-31 | End: 2022-01-04 | Stop reason: HOSPADM

## 2021-12-31 RX ORDER — DEXAMETHASONE SODIUM PHOSPHATE 4 MG/ML
6 INJECTION, SOLUTION INTRA-ARTICULAR; INTRALESIONAL; INTRAMUSCULAR; INTRAVENOUS; SOFT TISSUE ONCE
Status: COMPLETED | OUTPATIENT
Start: 2021-12-31 | End: 2021-12-31

## 2021-12-31 RX ORDER — ACETAMINOPHEN 500 MG
1000 TABLET ORAL ONCE
Status: COMPLETED | OUTPATIENT
Start: 2021-12-31 | End: 2021-12-31

## 2021-12-31 RX ORDER — DEXAMETHASONE SODIUM PHOSPHATE 4 MG/ML
6 INJECTION, SOLUTION INTRA-ARTICULAR; INTRALESIONAL; INTRAMUSCULAR; INTRAVENOUS; SOFT TISSUE DAILY
Status: DISCONTINUED | OUTPATIENT
Start: 2022-01-01 | End: 2022-01-04 | Stop reason: HOSPADM

## 2021-12-31 RX ORDER — ALBUTEROL SULFATE 90 UG/1
2 AEROSOL, METERED RESPIRATORY (INHALATION) EVERY 6 HOURS PRN
Status: DISCONTINUED | OUTPATIENT
Start: 2021-12-31 | End: 2021-12-31

## 2021-12-31 RX ORDER — AMOXICILLIN 250 MG
2 CAPSULE ORAL 2 TIMES DAILY PRN
Status: DISCONTINUED | OUTPATIENT
Start: 2021-12-31 | End: 2022-01-04 | Stop reason: HOSPADM

## 2021-12-31 RX ORDER — ASPIRIN 325 MG
325 TABLET ORAL ONCE
Status: COMPLETED | OUTPATIENT
Start: 2021-12-31 | End: 2021-12-31

## 2021-12-31 RX ORDER — LIDOCAINE 40 MG/G
CREAM TOPICAL
Status: DISCONTINUED | OUTPATIENT
Start: 2021-12-31 | End: 2022-01-04 | Stop reason: HOSPADM

## 2021-12-31 RX ORDER — IBUPROFEN 600 MG/1
600 TABLET, FILM COATED ORAL ONCE
Status: COMPLETED | OUTPATIENT
Start: 2021-12-31 | End: 2021-12-31

## 2021-12-31 RX ORDER — ONDANSETRON 4 MG/1
4 TABLET, ORALLY DISINTEGRATING ORAL EVERY 6 HOURS PRN
Status: DISCONTINUED | OUTPATIENT
Start: 2021-12-31 | End: 2022-01-04 | Stop reason: HOSPADM

## 2021-12-31 RX ADMIN — ACETAMINOPHEN 1000 MG: 500 TABLET, FILM COATED ORAL at 01:00

## 2021-12-31 RX ADMIN — SODIUM CHLORIDE 1000 ML: 9 INJECTION, SOLUTION INTRAVENOUS at 02:57

## 2021-12-31 RX ADMIN — DEXAMETHASONE SODIUM PHOSPHATE 6 MG: 4 INJECTION, SOLUTION INTRAMUSCULAR; INTRAVENOUS at 05:09

## 2021-12-31 RX ADMIN — ENOXAPARIN SODIUM 40 MG: 40 INJECTION SUBCUTANEOUS at 21:33

## 2021-12-31 RX ADMIN — IBUPROFEN 600 MG: 600 TABLET ORAL at 21:45

## 2021-12-31 RX ADMIN — ACETAMINOPHEN 650 MG: 325 TABLET, FILM COATED ORAL at 19:50

## 2021-12-31 RX ADMIN — ACETAMINOPHEN 1000 MG: 500 TABLET, FILM COATED ORAL at 05:12

## 2021-12-31 RX ADMIN — ALBUTEROL SULFATE 2 PUFF: 90 AEROSOL, METERED RESPIRATORY (INHALATION) at 03:53

## 2021-12-31 RX ADMIN — ENOXAPARIN SODIUM 40 MG: 40 INJECTION SUBCUTANEOUS at 09:52

## 2021-12-31 RX ADMIN — IOPAMIDOL 83 ML: 755 INJECTION, SOLUTION INTRAVENOUS at 04:12

## 2021-12-31 RX ADMIN — SODIUM CHLORIDE 50 ML: 9 INJECTION, SOLUTION INTRAVENOUS at 16:05

## 2021-12-31 RX ADMIN — ASPIRIN 325 MG ORAL TABLET 325 MG: 325 PILL ORAL at 03:53

## 2021-12-31 RX ADMIN — ACETAMINOPHEN 650 MG: 325 TABLET, FILM COATED ORAL at 12:44

## 2021-12-31 RX ADMIN — SODIUM CHLORIDE 100 ML: 900 INJECTION INTRAVENOUS at 04:12

## 2021-12-31 RX ADMIN — REMDESIVIR 200 MG: 100 INJECTION, POWDER, LYOPHILIZED, FOR SOLUTION INTRAVENOUS at 16:04

## 2021-12-31 RX ADMIN — HUMAN ALBUMIN MICROSPHERES AND PERFLUTREN 9 ML: 10; .22 INJECTION, SOLUTION INTRAVENOUS at 14:41

## 2021-12-31 RX ADMIN — IBUPROFEN 600 MG: 600 TABLET ORAL at 01:00

## 2021-12-31 ASSESSMENT — ACTIVITIES OF DAILY LIVING (ADL)
ADLS_ACUITY_SCORE: 3

## 2021-12-31 ASSESSMENT — ENCOUNTER SYMPTOMS
HEADACHES: 1
DIARRHEA: 1
SHORTNESS OF BREATH: 1
ABDOMINAL PAIN: 0
VOMITING: 0
FEVER: 1

## 2021-12-31 NOTE — PROGRESS NOTES
RECEIVING UNIT ED HANDOFF REVIEW    ED Nurse Handoff Report was reviewed by: Sierra Piedra RN on December 31, 2021 at 8:17 AM

## 2021-12-31 NOTE — ED NOTES
Patient is resting quietly on cart with eyes closed. Patient opens eyes spontaneously. Respirations are regular and unlabored. Patient repositions self independently on cart. Continue to monitor

## 2021-12-31 NOTE — H&P
[facility] [place]    History and Physical  Hospitalist       Date of Admission:  12/31/2021  Date of Service (when I saw the patient): 12/31/21    Assessment & Plan   Adin Meade is a 26 year old male who presents with fever, sob    COVID-19 pneumonia  Not vaccinated. Tested positive 12/25, sx started ~5-6 days prior with headache, myalgias, cough. Minimal SOB. Temp 102.8, O2 sats ok on RA. TSH 2.39. D-dimer 0.92. WBC 5.5. CT without PE, evidence of multifocal pneumonia/pneumonitis.   - dexamethasone 6 mg IV daily  - remdesivir if LFTs ok- needs to be ordered  - supplemental O2, continuous oximetry  - check CRP, LDH, ESR    NSTEMI  States minimal chest pain. Troponin 101. EKG NSR. Suspect type II NSTEMI 2/2 COVID  - telemetry   - serial troponins  - aspirin   - start heparin gtt if troponins rising  - echocardiogram  - cardiology consult pending above    Thrombocytopenia   Platelets 139 on presentation. No recent for comparison. Suspect related to COVID  - monitor    Morbid obesity  BMI 53.28. encourage weight loss and healthy lifestyle    DVT Prophylaxis: Enoxaparin (Lovenox) SQ  Code Status: Full Code    Disposition: Expected discharge in 2-5 days     Dimitrios Millan MD  [phone] (P)  Text Page     Primary Care Physician   Physician No Ref-Primary    Chief Complaint   Cough, headache, myalgias    History is obtained from the patient and medical records    History of Present Illness   Adin Meade is a 26 year old male who presents with fevers, cough, headache, myalgias, weakness.  He reports that symptoms presented on approximately December 24 with cold sweats.  He states over time the symptoms of gotten progressively worse.  He describes a headache as well as diffuse myalgias.  He has had a cough.  He also notes he has had profound fatigue and has basically slept for 3 days.  He states he has slight chest pain which he thinks is secondary to anxiety.  Denies any abdominal pain.   He had no nausea vomiting but does endorse some diarrhea.  He does not know if he had Covid contacts.  He states he has not been vaccinated for Covid but is interested now.    Past Medical History    I have reviewed this patient's medical history and updated it with pertinent information if needed.   Past Medical History:   Diagnosis Date     Obesity        Past Surgical History   I have reviewed this patient's surgical history and updated it with pertinent information if needed.  Past Surgical History:   Procedure Laterality Date     NO HISTORY OF SURGERY         Prior to Admission Medications   Prior to Admission Medications   Prescriptions Last Dose Informant Patient Reported? Taking?   doxycycline hyclate (VIBRA-TABS) 100 MG tablet   No No   Sig: Take 1 tablet (100 mg) by mouth 2 times daily      Facility-Administered Medications: None     Allergies   No Known Allergies    Social History   I have reviewed this patient's social history and updated it with pertinent information if needed. Adin Maede  reports that he has been smoking. He has never used smokeless tobacco. He reports current alcohol use. He reports current drug use. Drug: Marijuana.    Family History   I have reviewed this patient's family history and updated it with pertinent information if needed.   Family History   Problem Relation Age of Onset     Kidney Disease Paternal Grandmother      Cancer Paternal Grandfather        Review of Systems   The 10 point Review of Systems is negative other than noted in the HPI or here.     Physical Exam   Temp: (!) 101.2  F (38.4  C) Temp src: Oral BP: 110/62 Pulse: 91   Resp: 18 SpO2: 98 % O2 Device: None (Room air)    Vital Signs with Ranges  415 lbs 0 oz    Constitutional: alert, oriented and in no acute distress  Eyes: EOMI, PERRL  HEENT: OP clear  Respiratory: clear, some L sided basilar crackles  Cardiovascular: RRR no murmur. no edema.  GI: soft, nontender, morbidly obese, BS  present  Lymph/Hematologic: no cervical LAD  Genitourinary: deferred  Skin: no rashes or lesions grossly  Musculoskeletal: no deformities or arthritis  Neurologic: CN II-XII, FINE  Psychiatric: mood and affect wnl    Data   Data reviewed today:  I personally reviewed the EKG tracing showing NSR and the chest CT image(s) showing multifocal pneumonia.  Recent Labs   Lab 12/31/21  0255   WBC 5.5   HGB 13.9   MCV 86   *      POTASSIUM 4.0   CHLORIDE 106   CO2 23   BUN 10   CR 0.89   ANIONGAP 5   SINCERE 8.4*   *       Recent Results (from the past 24 hour(s))   CT Chest Pulmonary Embolism w Contrast    Narrative    EXAM: CT CHEST PULMONARY EMBOLISM W CONTRAST  LOCATION: [facility]  DATE/TIME: 12/31/2021 4:16 AM    INDICATION: PE suspected, low/intermediate prob, positive D-dimer  COMPARISON: None.  TECHNIQUE: CT chest pulmonary angiogram during arterial phase injection of IV contrast. Multiplanar reformats and MIP reconstructions were performed. Dose reduction techniques were used.   CONTRAST: 83mL Isovue-370    FINDINGS:  ANGIOGRAM CHEST: Pulmonary arteries are normal caliber and negative for pulmonary emboli. Thoracic aorta is negative for dissection. No CT evidence of right heart strain.    LUNGS AND PLEURA: Patchy multifocal airspace infiltrates involving all lobes of both lungs. No pneumothorax or pleural effusion.    MEDIASTINUM/AXILLAE: Heart size is normal. No pericardial effusion.    CORONARY ARTERY CALCIFICATION: None.    UPPER ABDOMEN: Normal.    MUSCULOSKELETAL: Normal.      Impression    IMPRESSION:  1.  Negative for pulmonary embolism.    2.  Evidence of multifocal pneumonia or pneumonitis.

## 2021-12-31 NOTE — PLAN OF CARE
Admission    Patient arrives to room 610 via cart from ED.  Care plan note:   DATE & TIME: 0800 ED admit    Cognitive Concerns/ Orientation : A&Ox4   BEHAVIOR & AGGRESSION TOOL COLOR: Green  CIWA SCORE: NA   ABNL VS/O2: Tmax 102.7, O2 need increased from RA to oxymizer cannula 6L sating 91%-94%. Mild ALVA. /79, 144/81, no signs of respiratory distress. LS clear/dim.   MOBILITY: independent, steady. Calls appropriately  PAIN MANAGMENT: c/o headache 9/10, received prn Tylenol x1.   DIET: Regular, poor appetite   BOWEL/BLADDER: continent, last BM earlier today and denies urinary symptoms.   ABNL LAB/BG: Trop 101, 91, 94, D-dimer 0.92, Covid19 positive, lactacte dehydrogenase 277, CT chest PE negative, showed evidence of multifocal pna or pneumonitis.   DRAIN/DEVICES: PIV access x1 saline locked   TELEMETRY RHYTHM: NSR   SKIN: intact  TESTS/PROCEDURES: none  D/C DATE: TBD  OTHER IMPORTANT INFO: Special precautions. On IV dexamethasone, lovenox subcutaneous and Remdesivir.   MD/RN ROUNDING SIGNED OFF D/E SHIFT: Yes  COMMIT TO SIT DONE AND SIGNED OFF Yes  IS THE PATIENT ON REMDESIVIR? DATE OF LAST SCHEDULED DOSE: 1/4/21        Inpatient nursing criteria listed below were met:    Did you put disposition on whiteboard and in sticky note: Yes  Full skin assessment done (add LDA if skin issue present) :Yes  Isolation education started/completed Yes  Patient allergies verified with patient: Yes  Fall Risk? (Care plan updated, Education given and documented) Yes  Primary Care Plan initiated: Yes  Home medications documented in belongings flowsheet: NA  Patient belongings documented in belongings flowsheet: Yes  Reminder note (belongings/ medications) placed in discharge instructions:Yes  Admission profile/ required documentation complete: Yes  If patient is a 72 hour hold/Commitment are belongings removed from room and locked up? NA

## 2021-12-31 NOTE — ED TRIAGE NOTES
pt reports testing covid positive on 12/25 - not vaccinated. c/o increasing fevers and sob - last took dayquil 4 hours ago.

## 2021-12-31 NOTE — ED NOTES
[facility]  ED Nurse Handoff Report    ED Chief complaint: Covid Concern (pt reports testing covid positive on 12/25 - not vaccinated. c/o increasing fevers and sob - last took dayquil 4 hours ago. )      ED Diagnosis:   Final diagnoses:   Infection due to 2019 novel coronavirus   Pneumonia due to 2019 novel coronavirus   Elevated troponin   Demand ischemia (H)       Code Status: Full Code    Allergies: No Known Allergies    Patient Story: pt tested positive on 12/25 - increasing sob, cough, fever and headache since. Pt has hx of asthma and is obese. Pt is not vaccinated against covid  Focused Assessment:  Pt a&o x3, tacchypneic, sob at rest, diaphoretic. +bodyaches and headache    Treatments and/or interventions provided: albuterol inhaler, fluids, CT r/o PE, ASA for elevated trop, antipyretics  Patient's response to treatments and/or interventions:     To be done/followed up on inpatient unit:  n/a    Does this patient have any cognitive concerns?: none    Activity level - Baseline/Home:  Independent  Activity Level - Current:   Independent    Patient's Preferred language: English   Needed?: No    Isolation: covid  Infection:   COVID r/o and special precautions  Patient tested for COVID 19 prior to admission: YES  Bariatric?: No    Vital Signs:   Vitals:    12/31/21 0053 12/31/21 0257   BP: 110/62    Pulse: 113 91   Resp: 16 18   Temp: (!) 102.8  F (39.3  C) 98  F (36.7  C)   TempSrc: Temporal Oral   SpO2: 95% 95%   Weight: (!) 188.2 kg (415 lb)        Cardiac Rhythm:     Was the PSS-3 completed:   Yes  What interventions are required if any?               Family Comments: n/a  OBS brochure/video discussed/provided to patient/family: N/A              Name of person given brochure if not patient: n/a              Relationship to patient: n/a    For the majority of the shift this patient's behavior was Green.   Behavioral interventions performed were reassurance and information.    ED  NURSE PHONE NUMBER: [phone]

## 2021-12-31 NOTE — PHARMACY-ADMISSION MEDICATION HISTORY
Pharmacy Medication History  Admission medication history interview status for the 12/31/2021  admission is complete. See EPIC admission navigator for prior to admission medications     Location of Interview: Phone  Medication history sources: Patient, Surescripts and Care Everywhere    Significant changes made to the medication list:  - Added ibuprofen    In the past week, patient estimated taking medication this percent of the time: N/A    Additional medication history information:   - Patient was prescribed doxycycline for a cyst on 12/12/21 for a 10 day course. He completed this course 12/30/21. I did not remove from list as the prescription still has 1 refill.    Medication reconciliation completed by provider prior to medication history? No    Time spent in this activity: 5 minutes    Prior to Admission medications    Medication Sig Last Dose Taking? Auth Provider   ibuprofen (ADVIL/MOTRIN) 200 MG capsule Take 600-800 mg by mouth every 6 hours as needed for fever prn Yes Unknown, Entered By History   doxycycline hyclate (VIBRA-TABS) 100 MG tablet Take 1 tablet (100 mg) by mouth 2 times daily Finished 12/30/21  Tulio Alvarado MD       The information provided in this note is only as accurate as the sources available at the time of update(s)

## 2021-12-31 NOTE — TELEPHONE ENCOUNTER
"Pt reports diagnosed with Covid 12/23/21 with home test and having persisting fever and headache. Oral temperature twenty minutes ago 102.9. Cough \"pretty bad today\". Pt denies chest pain, except with severe coughing spells or new onset shortness of breath. Pt has been trying home care methods, Dayquil, Nyquil and increasing fluids. Pt sounds nasally congested and uncomfortable.    Advised pt he can try using a neti pot and e visit recommended now. Call back if new concerns.    Pt verbalizes understanding and agrees to plan.          Reason for Disposition    [1] SEVERE pain AND [2] not improved 2 hours after pain medicine    Additional Information    Negative: SEVERE difficulty breathing (e.g., struggling for each breath, speaks in single words)    Negative: Difficult to awaken or acting confused (e.g., disoriented, slurred speech)    Negative: Bluish (or gray) lips or face now    Negative: Shock suspected (e.g., cold/pale/clammy skin, too weak to stand, low BP, rapid pulse)    Negative: Sounds like a life-threatening emergency to the triager    Negative: [1] COVID-19 exposure AND [2] no symptoms    Negative: COVID-19 vaccine reaction suspected (e.g., fever, headache, muscle aches) occurring 1 to 3 days after getting vaccine    Negative: COVID-19 vaccine, questions about    Negative: [1] Lives with someone known to have influenza (flu test positive) AND [2] flu-like symptoms (e.g., cough, runny nose, sore throat, SOB; with or without fever)    Negative: [1] Adult with possible COVID-19 symptoms AND [2] triager concerned about severity of symptoms or other causes    Negative: COVID-19 and breastfeeding, questions about    Negative: SEVERE or constant chest pain or pressure (Exception: mild central chest pain, present only when coughing)    Negative: MODERATE difficulty breathing (e.g., speaks in phrases, SOB even at rest, pulse 100-120)    Negative: [1] Headache AND [2] stiff neck (can't touch chin to " chest)    Protocols used: SINUS PAIN OR CONGESTION-A-AH, CORONAVIRUS (COVID-19) DIAGNOSED OR [id number] 8.25.2021

## 2021-12-31 NOTE — ED NOTES
Patient is awake, alert and Oriented to person, place, time and situation.    Airway patent.    Respirations are regular and unlabored.  Patient talking in full sentences.    Pulses are strong and regular with palpation.  Skin is normal color, hot and dry.   Cap refill is less than 3 seconds.  Patient denies chest pain/pressure.    Patient complaints of feeling chilled. RN assessed temperature and notes patient is febrile. MD Hill updated. VORB. Patient medicated as ordered. Patient placed on jerrell hugger for comfort.    Patient HOB adjusted for comfort. Patient bed in lowest position with side rails up x 2. Call light in reach.  Continue to monitor.

## 2021-12-31 NOTE — ED PROVIDER NOTES
History   Chief Complaint:  Covid Concern (pt reports testing covid positive on 12/25 - not vaccinated. c/o increasing fevers and sob - last took dayquil 4 hours ago. )     HPI   Adin Meade is a 26 year old male who presents with fever and shortness of breath. The patient states he tested positive for Covid on 12/25/21. He is not vaccinated. He has been experiencing increasing fevers and shortness of breath since. He last took Dayquil 4 hours ago. He was also experiencing a headache tonight. He has had diarrhea in the last day or two. No history of asthma. Denies vomiting or abdominal pain.    Review of Systems   Constitutional: Positive for fever.   Respiratory: Positive for shortness of breath.    Gastrointestinal: Positive for diarrhea. Negative for abdominal pain and vomiting.   Neurological: Positive for headaches.   All other systems reviewed and are negative.    Allergies:  No Known Drug Allergies     Medications:  None    Past Medical History:     Morbid obesity    Social History:  Presents alone  Marijuana use  Smoker    Physical Exam     Patient Vitals for the past 24 hrs:   BP Temp Temp src Pulse Resp SpO2 Weight   12/31/21 0400 -- -- -- -- -- 98 % --   12/31/21 0257 -- 98  F (36.7  C) Oral 91 18 95 % --   12/31/21 0053 110/62 (!) 102.8  F (39.3  C) Temporal 113 16 95 % (!) 188.2 kg (415 lb)     Physical Exam  General: Alert, appears well-developed and well-nourished. Cooperative.     In mild distress  HEENT:  Head:  Atraumatic  Ears:  External ears are normal  Mouth/Throat:  Oropharynx is without erythema or exudate and mucous membranes are dry.   Eyes:   Conjunctivae normal and EOM are normal. No scleral icterus.  CV:  Tachycardic rate, regular rhythm, normal heart sounds and radial pulses are 2+ and symmetric.  No murmur.  Resp:  Breath sounds are coarse/crackles to left lung.  Right lung clear.  Faint expiratory wheezing to left lung.    Non-labored, no retractions or accessory muscle use.  No  supplemental O2 use.   GI:  Abdomen is soft, no distension, no tenderness. No rebound or guarding.  No CVA tenderness bilaterally  MS:  Normal range of motion. No edema.    Normal strength in all 4 extremities.     Back atraumatic.    No midline cervical, thoracic, or lumbar tenderness  Skin:  Warm and dry.  No rash or lesions noted.  Neuro: Alert. Normal strength.  GCS: 15  Psych:  Normal mood and affect.  Lymph: No anterior or posterior cervical lymphadenopathy noted.    Emergency Department Course   ECG  ECG obtained at 0307, ECG read at 0310  Covid   No prior EKG for comparison.  Rate 91 bpm. MS interval 142 ms. QRS duration 92 ms. QT/QTc 334/410 ms. P-R-T axes 15 38 10. Normal sinus rhythm.      Imaging:  CT Chest Pulmonary Embolism w Contrast   Final Result   IMPRESSION:   1.  Negative for pulmonary embolism.      2.  Evidence of multifocal pneumonia or pneumonitis.        Report per radiology    Laboratory:  Labs Ordered and Resulted from Time of ED Arrival to Time of ED Departure   D DIMER QUANTITATIVE - Abnormal       Result Value    D-Dimer Quantitative 0.92 (*)    BASIC METABOLIC PANEL - Abnormal    Sodium 134      Potassium 4.0      Chloride 106      Carbon Dioxide (CO2) 23      Anion Gap 5      Urea Nitrogen 10      Creatinine 0.89      Calcium 8.4 (*)     Glucose 113 (*)     GFR Estimate >90     TROPONIN I - Abnormal    Troponin I High Sensitivity 101 (*)    CBC WITH PLATELETS AND DIFFERENTIAL - Abnormal    WBC Count 5.5      RBC Count 4.96      Hemoglobin 13.9      Hematocrit 42.6      MCV 86      MCH 28.0      MCHC 32.6      RDW 12.6      Platelet Count 139 (*)     % Neutrophils 75      % Lymphocytes 20      % Monocytes 5      % Eosinophils 0      % Basophils 0      % Immature Granulocytes 0      NRBCs per 100 WBC 0      Absolute Neutrophils 4.2      Absolute Lymphocytes 1.1      Absolute Monocytes 0.3      Absolute Eosinophils 0.0      Absolute Basophils 0.0      Absolute Immature Granulocytes 0.0       Absolute NRBCs 0.0     TSH WITH FREE T4 REFLEX - Normal    TSH 2.39     INFLUENZA A/B & SARS-COV2 PCR MULTIPLEX      Emergency Department Course:    Reviewed:  I reviewed nursing notes, vitals and past medical history    Assessments:  0310 I obtained history and examined the patient as noted above.   0433 I rechecked the patient and explained findings.     Consults:  0424 I spoke with Dr. Millan of the hospitalist service.     Interventions:  0100: Tylenol 1000 mg PO   0100: Ibuprofen 600 mg PO    0257: NS 1L IV Bolus    0353: Aspirin 325 mg PO   0353: Albuterol inhaler 2 puffs inhalation    Disposition:  The patient was admitted to the hospital under the care of Dr. Millan.     Impression & Plan     CMS Diagnoses: None    Medical Decision Making:  Patient is a 26-year-old male with recent diagnosis of Covid on the 25th who presents with chest pain and shortness of breath.  Patient appeared septic on arrival and so a broad work-up was pursued.  Thankfully patient does not have a significant leukocytosis and fever/tachycardia resolved after antipyresis and IV fluids.  Unfortunately patient does appear to have an elevated troponin although EKG is nonischemic.  I do have high concern for demand ischemia in the setting of COVID-19.  Patient does have evidence of multifocal pneumonia in the setting of COVID-19, but thankfully no evidence of pulmonary embolism seen on CT.  Patient did receive a dose of IV Decadron in the emergency department.  He had coarse lung sounds and some mild wheezing on the left lung field.  He was given albuterol inhaler to utilize for wheezing in the setting of multifocal pneumonia/COVID-19.  He will be admitted to the hospitalist service due to demand ischemia.  I spoke with Dr. Millan of the hospitalist service who agreed to admission.     Diagnosis:    ICD-10-CM    1. Infection due to 2019 novel coronavirus  U07.1    2. Pneumonia due to 2019 novel coronavirus  U07.1     J12.82     3. Elevated troponin  R77.8    4. Demand ischemia (H)  I24.8      Scribe Disclosure:  I, Samantha Hermosillo, am serving as a scribe at 2:40 AM on 12/31/2021 to document services personally performed by Ankur Hill MD based on my observations and the provider's statements to me.            Ankur Hill MD  12/31/21 0593

## 2021-12-31 NOTE — INTERIM SUMMARY
Please refer to the H&P note form earlier today for the details of this presentation, in brief, Adin Meade is a 26 year old male who presents today for COVID-19 Pneumonia  - plan per H&P,   Additionally, will add Remdesivir  Given the fact the LFT are wnl and the patient is becoming more hypoxic.  - Serum troponin is trending down, no indication for heaping infusion      Anthony Parisi MD, MS   [phone] (p)

## 2022-01-01 LAB
ANION GAP SERPL CALCULATED.3IONS-SCNC: 7 MMOL/L (ref 3–14)
BASE EXCESS BLDV CALC-SCNC: 1.2 MMOL/L (ref -7.7–1.9)
BUN SERPL-MCNC: 10 MG/DL (ref 7–30)
CALCIUM SERPL-MCNC: 8.8 MG/DL (ref 8.5–10.1)
CHLORIDE BLD-SCNC: 104 MMOL/L (ref 94–109)
CO2 SERPL-SCNC: 24 MMOL/L (ref 20–32)
CREAT SERPL-MCNC: 0.8 MG/DL (ref 0.66–1.25)
CRP SERPL-MCNC: 58.6 MG/L (ref 0–8)
ERYTHROCYTE [DISTWIDTH] IN BLOOD BY AUTOMATED COUNT: 12.6 % (ref 10–15)
GFR SERPL CREATININE-BSD FRML MDRD: >90 ML/MIN/1.73M2
GLUCOSE BLD-MCNC: 142 MG/DL (ref 70–99)
HCO3 BLDV-SCNC: 26 MMOL/L (ref 21–28)
HCT VFR BLD AUTO: 42.5 % (ref 40–53)
HGB BLD-MCNC: 14.2 G/DL (ref 13.3–17.7)
LDH SERPL L TO P-CCNC: 444 U/L (ref 85–227)
MCH RBC QN AUTO: 27.9 PG (ref 26.5–33)
MCHC RBC AUTO-ENTMCNC: 33.4 G/DL (ref 31.5–36.5)
MCV RBC AUTO: 84 FL (ref 78–100)
O2/TOTAL GAS SETTING VFR VENT: 0 %
PCO2 BLDV: 43 MM HG (ref 40–50)
PH BLDV: 7.4 [PH] (ref 7.32–7.43)
PLATELET # BLD AUTO: 171 10E3/UL (ref 150–450)
PO2 BLDV: 54 MM HG (ref 25–47)
POTASSIUM BLD-SCNC: 3.9 MMOL/L (ref 3.4–5.3)
RBC # BLD AUTO: 5.09 10E6/UL (ref 4.4–5.9)
SODIUM SERPL-SCNC: 135 MMOL/L (ref 133–144)
WBC # BLD AUTO: 6.1 10E3/UL (ref 4–11)

## 2022-01-01 PROCEDURE — 85014 HEMATOCRIT: CPT | Performed by: HOSPITALIST

## 2022-01-01 PROCEDURE — 82310 ASSAY OF CALCIUM: CPT | Performed by: HOSPITALIST

## 2022-01-01 PROCEDURE — 999N000157 HC STATISTIC RCP TIME EA 10 MIN

## 2022-01-01 PROCEDURE — 250N000011 HC RX IP 250 OP 636: Performed by: INTERNAL MEDICINE

## 2022-01-01 PROCEDURE — 99233 SBSQ HOSP IP/OBS HIGH 50: CPT | Performed by: HOSPITALIST

## 2022-01-01 PROCEDURE — 250N000013 HC RX MED GY IP 250 OP 250 PS 637: Performed by: HOSPITALIST

## 2022-01-01 PROCEDURE — 36415 COLL VENOUS BLD VENIPUNCTURE: CPT | Performed by: HOSPITALIST

## 2022-01-01 PROCEDURE — 83615 LACTATE (LD) (LDH) ENZYME: CPT | Performed by: HOSPITALIST

## 2022-01-01 PROCEDURE — 250N000009 HC RX 250: Performed by: HOSPITALIST

## 2022-01-01 PROCEDURE — 120N000001 HC R&B MED SURG/OB

## 2022-01-01 PROCEDURE — 258N000003 HC RX IP 258 OP 636: Performed by: HOSPITALIST

## 2022-01-01 PROCEDURE — 82803 BLOOD GASES ANY COMBINATION: CPT | Performed by: HOSPITALIST

## 2022-01-01 PROCEDURE — 94660 CPAP INITIATION&MGMT: CPT

## 2022-01-01 PROCEDURE — 250N000013 HC RX MED GY IP 250 OP 250 PS 637: Performed by: INTERNAL MEDICINE

## 2022-01-01 PROCEDURE — 86140 C-REACTIVE PROTEIN: CPT | Performed by: INTERNAL MEDICINE

## 2022-01-01 RX ORDER — ACETAMINOPHEN 325 MG/1
650 TABLET ORAL EVERY 6 HOURS PRN
Status: DISCONTINUED | OUTPATIENT
Start: 2022-01-01 | End: 2022-01-01

## 2022-01-01 RX ORDER — ACETAMINOPHEN 650 MG/1
325 SUPPOSITORY RECTAL EVERY 4 HOURS PRN
Status: DISCONTINUED | OUTPATIENT
Start: 2022-01-01 | End: 2022-01-01

## 2022-01-01 RX ORDER — ACETAMINOPHEN 325 MG/1
650 TABLET ORAL EVERY 4 HOURS PRN
Status: DISCONTINUED | OUTPATIENT
Start: 2022-01-01 | End: 2022-01-04 | Stop reason: HOSPADM

## 2022-01-01 RX ORDER — ACETAMINOPHEN 650 MG/1
325 SUPPOSITORY RECTAL EVERY 4 HOURS PRN
Status: DISCONTINUED | OUTPATIENT
Start: 2022-01-01 | End: 2022-01-04 | Stop reason: HOSPADM

## 2022-01-01 RX ADMIN — SODIUM CHLORIDE 50 ML: 9 INJECTION, SOLUTION INTRAVENOUS at 16:03

## 2022-01-01 RX ADMIN — ENOXAPARIN SODIUM 40 MG: 40 INJECTION SUBCUTANEOUS at 09:14

## 2022-01-01 RX ADMIN — IBUPROFEN 600 MG: 600 TABLET ORAL at 16:02

## 2022-01-01 RX ADMIN — ACETAMINOPHEN 650 MG: 325 TABLET, FILM COATED ORAL at 17:05

## 2022-01-01 RX ADMIN — ACETAMINOPHEN 650 MG: 325 TABLET, FILM COATED ORAL at 22:13

## 2022-01-01 RX ADMIN — DEXAMETHASONE SODIUM PHOSPHATE 6 MG: 4 INJECTION, SOLUTION INTRAMUSCULAR; INTRAVENOUS at 05:29

## 2022-01-01 RX ADMIN — ENOXAPARIN SODIUM 40 MG: 40 INJECTION SUBCUTANEOUS at 20:38

## 2022-01-01 RX ADMIN — ASPIRIN 81 MG CHEWABLE TABLET 81 MG: 81 TABLET CHEWABLE at 09:14

## 2022-01-01 RX ADMIN — IBUPROFEN 600 MG: 600 TABLET ORAL at 22:13

## 2022-01-01 RX ADMIN — ACETAMINOPHEN 650 MG: 325 TABLET, FILM COATED ORAL at 09:14

## 2022-01-01 RX ADMIN — REMDESIVIR 100 MG: 100 INJECTION, POWDER, LYOPHILIZED, FOR SOLUTION INTRAVENOUS at 16:03

## 2022-01-01 RX ADMIN — ACETAMINOPHEN 650 MG: 325 TABLET, FILM COATED ORAL at 02:57

## 2022-01-01 RX ADMIN — IBUPROFEN 600 MG: 600 TABLET ORAL at 05:28

## 2022-01-01 ASSESSMENT — ACTIVITIES OF DAILY LIVING (ADL)
ADLS_ACUITY_SCORE: 3

## 2022-01-01 NOTE — PROGRESS NOTES
[facility]    Medicine Progress Note - Hospitalist Service       Date of Admission:  12/31/2021    Assessment & Plan           Adin Meade is a 26 year old male who presents with fever, sob    COVID-19 pneumonia  Not vaccinated. Tested positive 12/25, sx started ~5-6 days prior with headache, myalgias, cough. Minimal SOB. Temp 102.8, O2 sats ok on RA. TSH 2.39. D-dimer 0.92. WBC 5.5. CT without PE, evidence of multifocal pneumonia/pneumonitis.   - dexamethasone 6 mg IV daily  - remdesivir   - supplemental O2, continuous oximetry  - check CRP, LDH, ESR  - contact precautions per protocol  The patient continues to febrile, analgized with tylenol and NSAIDs     RODERICK, suspected   CPAP at night,  TR to adjust the settinsg    Type-II MI  Demand ischemia  States minimal chest pain. Troponin 101. EKG NSR. Suspect type II NSTEMI 2/2 COVID  - telemetry   - serial troponins  - aspirin   - echocardiogram revealed normal LVEF, no wall motions abnormalities,  - cardiology consult pending above    Thrombocytopenia   Platelets 139 on presentation. No recent for comparison. Suspect related to COVID  - monitor    Morbid obesity  BMI 53.28. encourage weight loss and healthy lifestyle    DVT Prophylaxis: Enoxaparin (Lovenox) SQ  Code Status: Full Code    Disposition: Expected discharge in 2-5 days     Gerber Parisi MD, MS  [phone] (P)       Diet: Combination Diet Regular Diet Adult    DVT Prophylaxis: Enoxaparin (Lovenox) SQ  Calderon Catheter: Not present  Central Lines: None  Code Status: Full Code      Disposition Plan   Expected Discharge: 01/02/2022     Anticipated discharge location:  Awaiting care coordination huddle  Delays:        TBD, pending defeferishment and improving hypoxia     The patient's care was discussed with the Bedside Nurse and Patient.    Anthony Parisi MD  Hospitalist Service  [facility]  Securely message with the Vocera Web Console (learn more  here)  Text page via [facility] Paging/Directory        Clinically Significant Risk Factors Present on Admission                 ______________________________________________________________________    Interval History   Feels better, ate his dinner last night, continues to have high feve. No chest pain no bowel or bladder issue,    Data reviewed today: I reviewed all medications, new labs and imaging results over the last 24 hours. I personally reviewed no images or EKG's today.    Physical Exam   Vital Signs: Temp: (!) 102.5  F (39.2  C) Temp src: Oral BP: (!) 149/88 Pulse: 102   Resp: 20 SpO2: 94 % O2 Device: None (Room air) Oxygen Delivery: 6 LPM  Weight: 415 lbs 0 oz  General Appearance: Alert In NAD   Respiratory:  Course breath sounds, No wheezing , no crackles, distant breath sounds due to body habitus  Cardiovascular:  RRR no murmurs   GI:  PObese, soft NT/ND + BS   Skin:  Warm dry no acute rashes  Pysch: oriented X3,  normal speech pattern and thought process      Data   Recent Labs   Lab 12/31/21  0255   WBC 5.5   HGB 13.9   MCV 86   *     134   POTASSIUM 4.1  4.0   CHLORIDE 108  106   CO2 21  23   BUN 10  10   CR 0.88  0.89   ANIONGAP 6  5   SINCERE 8.4*  8.4*   *  113*   ALBUMIN 3.5   PROTTOTAL 7.6   BILITOTAL 0.4   ALKPHOS 47   ALT 48   AST 40

## 2022-01-01 NOTE — PLAN OF CARE
"Cognitive Concerns/ Orientation : A&Ox4   BEHAVIOR & AGGRESSION TOOL COLOR: Green  CIWA SCORE: NA    ABNL VS/O2: Tmax 102.7, 102.5: PRN Tylenol alternating with with Advil given, recheck 101.2 then recheck 99.4 Other VSS on RA ; Oxygen saturation 91-95%  MOBILITY: independent, steady. Calls appropriately  PAIN MANAGMENT: c/o headache 9/10, received prn Tylenol x1; effective  DIET: Regular  BOWEL/BLADDER: continent, last BM earlier  ABNL LAB/BG: Trop 101, 91, 94, (MD aware of trop level), D-dimer 0.92, Covid19 positive, lactacte dehydrogenase 277, CT chest PE negative, showed evidence of multifocal pna or pneumonitis.   DRAIN/DEVICES: PIV access x1 saline locked   TELEMETRY RHYTHM: NSR   SKIN: intact  TESTS/PROCEDURES: none  D/C DATE: TBD  OTHER IMPORTANT INFO: Special precautions. On IV dexamethasone, lovenox subcutaneous and IV Remdesivir.  Refused pulse Oximeter(\"because it wakes him up\"). I offered him Oxygen but he declined.He states he has undiagnosed sleep apnea and is yet to have sleep study done so he can get a CPAP.   IS THE PATIENT ON REMDESIVIR? DATE OF LAST SCHEDULED DOSE: 1/4/21  "

## 2022-01-01 NOTE — PLAN OF CARE
Summary: Covid19 pneumonia, elevated Trop.    DATE & TIME: 12/31/21 2756-8116 ED admit         Cognitive Concerns/ Orientation : A&Ox4   BEHAVIOR & AGGRESSION TOOL COLOR: Green  CIWA SCORE: NA    ABNL VS/O2: Tmax 102.7, pt has been on 6L oxymizer cannula whole day to keep O2 >90%, pt was tired, c/o weakness/febrile and slept most of the shift. Pt more alert in the evening and O2 has been on RA since 1700 sating 91%-95% at rest, no signs of respiratory distress. LS clear/dim. Encouraged IS use.   MOBILITY: independent, steady. Calls appropriately  PAIN MANAGMENT: c/o headache 9/10, received prn Tylenol x1.   DIET: Regular, didn't eat breakfast and lunch, better appetite for dinner.   BOWEL/BLADDER: continent, last BM earlier today and denies urinary symptoms.   ABNL LAB/BG: Trop 101, 91, 94, (MD aware of trop level), D-dimer 0.92, Covid19 positive, lactacte dehydrogenase 277, CT chest PE negative, showed evidence of multifocal pna or pneumonitis.   DRAIN/DEVICES: PIV access x1 saline locked   TELEMETRY RHYTHM: NSR   SKIN: intact  TESTS/PROCEDURES: none  D/C DATE: TBD  OTHER IMPORTANT INFO: Special precautions. On IV dexamethasone, lovenox subcutaneous and IV Remdesivir.   IS THE PATIENT ON REMDESIVIR? DATE OF LAST SCHEDULED DOSE: 1/4/21

## 2022-01-02 LAB
ANION GAP SERPL CALCULATED.3IONS-SCNC: 4 MMOL/L (ref 3–14)
BUN SERPL-MCNC: 12 MG/DL (ref 7–30)
CALCIUM SERPL-MCNC: 8.4 MG/DL (ref 8.5–10.1)
CHLORIDE BLD-SCNC: 104 MMOL/L (ref 94–109)
CO2 SERPL-SCNC: 27 MMOL/L (ref 20–32)
CREAT SERPL-MCNC: 0.76 MG/DL (ref 0.66–1.25)
CRP SERPL-MCNC: 56.3 MG/L (ref 0–8)
ERYTHROCYTE [DISTWIDTH] IN BLOOD BY AUTOMATED COUNT: 12.7 % (ref 10–15)
GFR SERPL CREATININE-BSD FRML MDRD: >90 ML/MIN/1.73M2
GLUCOSE BLD-MCNC: 106 MG/DL (ref 70–99)
HCT VFR BLD AUTO: 43.5 % (ref 40–53)
HGB BLD-MCNC: 14.2 G/DL (ref 13.3–17.7)
MCH RBC QN AUTO: 27.4 PG (ref 26.5–33)
MCHC RBC AUTO-ENTMCNC: 32.6 G/DL (ref 31.5–36.5)
MCV RBC AUTO: 84 FL (ref 78–100)
PLATELET # BLD AUTO: 188 10E3/UL (ref 150–450)
POTASSIUM BLD-SCNC: 3.8 MMOL/L (ref 3.4–5.3)
RBC # BLD AUTO: 5.18 10E6/UL (ref 4.4–5.9)
SODIUM SERPL-SCNC: 135 MMOL/L (ref 133–144)
WBC # BLD AUTO: 6.9 10E3/UL (ref 4–11)

## 2022-01-02 PROCEDURE — 120N000001 HC R&B MED SURG/OB

## 2022-01-02 PROCEDURE — 250N000009 HC RX 250: Performed by: HOSPITALIST

## 2022-01-02 PROCEDURE — 36415 COLL VENOUS BLD VENIPUNCTURE: CPT | Performed by: HOSPITALIST

## 2022-01-02 PROCEDURE — 250N000013 HC RX MED GY IP 250 OP 250 PS 637: Performed by: HOSPITALIST

## 2022-01-02 PROCEDURE — 250N000011 HC RX IP 250 OP 636: Performed by: INTERNAL MEDICINE

## 2022-01-02 PROCEDURE — 86140 C-REACTIVE PROTEIN: CPT | Performed by: INTERNAL MEDICINE

## 2022-01-02 PROCEDURE — 85014 HEMATOCRIT: CPT | Performed by: HOSPITALIST

## 2022-01-02 PROCEDURE — 250N000013 HC RX MED GY IP 250 OP 250 PS 637: Performed by: STUDENT IN AN ORGANIZED HEALTH CARE EDUCATION/TRAINING PROGRAM

## 2022-01-02 PROCEDURE — 80048 BASIC METABOLIC PNL TOTAL CA: CPT | Performed by: HOSPITALIST

## 2022-01-02 PROCEDURE — 99232 SBSQ HOSP IP/OBS MODERATE 35: CPT | Performed by: STUDENT IN AN ORGANIZED HEALTH CARE EDUCATION/TRAINING PROGRAM

## 2022-01-02 PROCEDURE — 258N000003 HC RX IP 258 OP 636: Performed by: HOSPITALIST

## 2022-01-02 PROCEDURE — 250N000013 HC RX MED GY IP 250 OP 250 PS 637: Performed by: INTERNAL MEDICINE

## 2022-01-02 RX ORDER — OXYCODONE HYDROCHLORIDE 5 MG/1
5 TABLET ORAL ONCE
Status: DISCONTINUED | OUTPATIENT
Start: 2022-01-02 | End: 2022-01-04

## 2022-01-02 RX ORDER — BENZONATATE 100 MG/1
100 CAPSULE ORAL 3 TIMES DAILY PRN
Status: DISCONTINUED | OUTPATIENT
Start: 2022-01-02 | End: 2022-01-04 | Stop reason: HOSPADM

## 2022-01-02 RX ORDER — HYDROXYZINE HYDROCHLORIDE 25 MG/1
25 TABLET, FILM COATED ORAL EVERY 4 HOURS PRN
Status: DISCONTINUED | OUTPATIENT
Start: 2022-01-02 | End: 2022-01-04 | Stop reason: HOSPADM

## 2022-01-02 RX ADMIN — MELATONIN 5 MG TABLET 10 MG: at 22:33

## 2022-01-02 RX ADMIN — ACETAMINOPHEN 650 MG: 325 TABLET, FILM COATED ORAL at 22:34

## 2022-01-02 RX ADMIN — SODIUM CHLORIDE 50 ML: 9 INJECTION, SOLUTION INTRAVENOUS at 16:19

## 2022-01-02 RX ADMIN — ENOXAPARIN SODIUM 40 MG: 40 INJECTION SUBCUTANEOUS at 09:21

## 2022-01-02 RX ADMIN — ACETAMINOPHEN 650 MG: 325 TABLET, FILM COATED ORAL at 17:32

## 2022-01-02 RX ADMIN — BENZONATATE 100 MG: 100 CAPSULE ORAL at 22:33

## 2022-01-02 RX ADMIN — IBUPROFEN 600 MG: 600 TABLET ORAL at 06:38

## 2022-01-02 RX ADMIN — DEXAMETHASONE SODIUM PHOSPHATE 6 MG: 4 INJECTION, SOLUTION INTRAMUSCULAR; INTRAVENOUS at 06:25

## 2022-01-02 RX ADMIN — BENZONATATE 100 MG: 100 CAPSULE ORAL at 17:32

## 2022-01-02 RX ADMIN — ACETAMINOPHEN 650 MG: 325 TABLET, FILM COATED ORAL at 06:37

## 2022-01-02 RX ADMIN — ASPIRIN 81 MG CHEWABLE TABLET 81 MG: 81 TABLET CHEWABLE at 09:21

## 2022-01-02 RX ADMIN — BENZONATATE 100 MG: 100 CAPSULE ORAL at 06:37

## 2022-01-02 RX ADMIN — IBUPROFEN 600 MG: 600 TABLET ORAL at 22:34

## 2022-01-02 RX ADMIN — ENOXAPARIN SODIUM 40 MG: 40 INJECTION SUBCUTANEOUS at 20:42

## 2022-01-02 RX ADMIN — ACETAMINOPHEN 650 MG: 325 TABLET, FILM COATED ORAL at 11:43

## 2022-01-02 RX ADMIN — REMDESIVIR 100 MG: 100 INJECTION, POWDER, LYOPHILIZED, FOR SOLUTION INTRAVENOUS at 16:18

## 2022-01-02 ASSESSMENT — ACTIVITIES OF DAILY LIVING (ADL)
ADLS_ACUITY_SCORE: 3

## 2022-01-02 NOTE — PROGRESS NOTES
[facility]    Medicine Progress Note - Hospitalist Service       Date of Admission:  12/31/2021    Assessment & Plan             Adin Meade is a 26 year old male who presents with fever, sob    COVID-19 pneumonia  Not vaccinated. Tested positive 12/25, sx started ~5-6 days prior with headache, myalgias, cough. Minimal SOB.. D-dimer 0.92. WBC 5.5. CT without PE, evidence of multifocal pneumonia/pneumonitis.     > continue daily dexamethasone and remdesivir   > not hypoxic but likely has RODERICK and dests at night or while sleeping, continue supplemental O2 as needed  > follow COVID labs, initially worsening but small improvement today  > other supportive care with tylenol, Advil and cough suppressants     RODERICK, suspected   PAP at night if patient can tolerate, HEPA filter in place    Type-II NSTEMI  Demand ischemia  Troponin 101>94 on admission. EKG NSR. Suspected type II NSTEMI 2/2 COVID  - echocardiogram revealed normal LVE with LVH, no wall motions abnormalities    > follow on tele    Thrombocytopenia -improving  Platelets 139 on presentation. No recent for comparison. Suspect related to COVID  - monitor    Morbid obesity  BMI 53.28. encourage weight loss and healthy lifestyle  - can contribute to COVID complications       Diet: Combination Diet Regular Diet Adult    DVT Prophylaxis: Enoxaparin (Lovenox) SQ  Calderon Catheter: Not present  Central Lines: None  Code Status: Full Code      Disposition Plan   Expected Discharge: 2-3 days pending clinical improvement     The patient's care was discussed with the Bedside Nurse and Patient.    Jocelyne Agosto DO  Hospitalist Service  [facility]  Securely message with the Vocera Web Console (learn more here)  Text page via [employer] Paging/Directory        Clinically Significant Risk Factors Present on Admission                 ______________________________________________________________________    Interval History    Patient very talkative and a bit anxious today. Tells me he can't sleep at all due being in the hospital, the bed the alarms etc. + headache general fatigue and malaise +cough + fevers No diarrhea or GI complaints. He feels very winded when walking around, no true hypoxia recorded though.    Data reviewed today: I reviewed all medications, new labs and imaging results over the last 24 hours. I personally reviewed normal EKG from admission.    Physical Exam   Vital Signs: Temp: 98.8  F (37.1  C) Temp src: Oral BP: (!) 148/74 Pulse: 87   Resp: 18 SpO2: 94 % O2 Device: None (Room air) Oxygen Delivery: 4 LPM  Weight: 415 lbs 0 oz     General Appearance: Alert In NAD   Respiratory:  Course breath sounds, No wheezing , no crackles, distant breath sounds due to body habitus  Cardiovascular:  RRR no murmurs   GI:  Obese, soft NT/ND + BS   Skin:  Warm dry no acute rashes  Pysch: oriented X3,  normal speech pattern and thought process  a bit anxious with a lot of questions    Data   Recent Labs   Lab 01/02/22  0611 01/01/22  1215 12/31/21  0255   WBC 6.9 6.1 5.5   HGB 14.2 14.2 13.9   MCV 84 84 86    171 139*    135 135  134   POTASSIUM 3.8 3.9 4.1  4.0   CHLORIDE 104 104 108  106   CO2 27 24 21  23   BUN 12 10 10  10   CR 0.76 0.80 0.88  0.89   ANIONGAP 4 7 6  5   SINCERE 8.4* 8.8 8.4*  8.4*   * 142* 111*  113*   ALBUMIN  --   --  3.5   PROTTOTAL  --   --  7.6   BILITOTAL  --   --  0.4   ALKPHOS  --   --  47   ALT  --   --  48   AST  --   --  40

## 2022-01-02 NOTE — PROVIDER NOTIFICATION
MD Notification    Notified Person: MD    Notified Person Name: Dr. Millan    Notification Date/Time:1/2/22 5:01am    Notification Interaction: [employer]    Purpose of Notification: Patient requesting cough medicine.  He does not want Robitussin    Orders Received:    Comments: PRN Tessalon ordered

## 2022-01-02 NOTE — PLAN OF CARE
Summary: Covid19 pneumonia, Elevated Trop   DATE & TIME: 1/1/22 3966-1632        Cognitive Concerns/ Orientation : A&Ox4   BEHAVIOR & AGGRESSION TOOL COLOR: Green  CIWA SCORE: NA    ABNL VS/O2: Tmax 100.2, other VSS on RA, O2 intermittently drops <90% while asleep, suspected RODERICK, MD ordered CPAP for bedtime.   MOBILITY: independent, steady. Calls appropriately  PAIN MANAGMENT: c/o headache and back pain 8-9/10, received prn Tylenol x2 and Ibuprofen x1.  DIET: Regular  BOWEL/BLADDER: continent  ABNL LAB/BG: D-dimer 0.92, Covid19 positive 12/31/21, lactacte dehydrogenase 277, CT chest on 12/31, PE negative, showed evidence of multifocal pna or pneumonitis.   DRAIN/DEVICES: PIV access x1 saline locked.    TELEMETRY RHYTHM: NSR   SKIN: intact  TESTS/PROCEDURES: none  D/C DATE: TBD  OTHER IMPORTANT INFO: Special precautions. On IV dexamethasone, lovenox subcutaneous and IV Remdesivir.    IS THE PATIENT ON REMDESIVIR? DATE OF LAST SCHEDULED DOSE: 1/4/21

## 2022-01-02 NOTE — PLAN OF CARE
"Cognitive Concerns/ Orientation : A&Ox4   BEHAVIOR & AGGRESSION TOOL COLOR: Green  CIWA SCORE: NA    ABNL VS/O2: Max Temp 101.1. He Tolerated CPAP for about 45 minutes; he reported he felt like the CPAP was \"chocking him\". Placed on 4L via Oximyzer due to complaining of SOB/difficulty breathing  MOBILITY: independent, steady. Calls appropriately  PAIN MANAGMENT: c/o headache and back pain 6-9/10, received prn Tylenol x2 and Ibuprofen x2.  DIET: Regular  BOWEL/BLADDER: continent  ABNL LAB/BG: D-dimer 0.92, Covid19 positive 12/31/21, lactacte dehydrogenase 277, CT chest on 12/31, PE negative, showed evidence of multifocal pna or pneumonitis.   DRAIN/DEVICES: PIV access x1 saline locked   TELEMETRY RHYTHM: NSR   SKIN: intact  TESTS/PROCEDURES: none  D/C DATE: TBD  OTHER IMPORTANT INFO: Special precautions. On IV dexamethasone, lovenox subcutaneous and IV Remdesivir.    IS THE PATIENT ON REMDESIVIR? DATE OF LAST SCHEDULED DOSE: 1/4/21  "

## 2022-01-03 LAB
ANION GAP SERPL CALCULATED.3IONS-SCNC: 5 MMOL/L (ref 3–14)
BUN SERPL-MCNC: 20 MG/DL (ref 7–30)
CALCIUM SERPL-MCNC: 7.1 MG/DL (ref 8.5–10.1)
CHLORIDE BLD-SCNC: 103 MMOL/L (ref 94–109)
CO2 SERPL-SCNC: 25 MMOL/L (ref 20–32)
CREAT SERPL-MCNC: 1.23 MG/DL (ref 0.66–1.25)
CRP SERPL-MCNC: 9.6 MG/L (ref 0–8)
ERYTHROCYTE [DISTWIDTH] IN BLOOD BY AUTOMATED COUNT: 12.5 % (ref 10–15)
ERYTHROCYTE [DISTWIDTH] IN BLOOD BY AUTOMATED COUNT: 13.7 % (ref 10–15)
GFR SERPL CREATININE-BSD FRML MDRD: 82 ML/MIN/1.73M2
GLUCOSE BLD-MCNC: 343 MG/DL (ref 70–99)
HCT VFR BLD AUTO: 32.9 % (ref 40–53)
HCT VFR BLD AUTO: 41.4 % (ref 40–53)
HGB BLD-MCNC: 11.1 G/DL (ref 13.3–17.7)
HGB BLD-MCNC: 13.7 G/DL (ref 13.3–17.7)
MCH RBC QN AUTO: 27.9 PG (ref 26.5–33)
MCH RBC QN AUTO: 30.5 PG (ref 26.5–33)
MCHC RBC AUTO-ENTMCNC: 33.1 G/DL (ref 31.5–36.5)
MCHC RBC AUTO-ENTMCNC: 33.7 G/DL (ref 31.5–36.5)
MCV RBC AUTO: 84 FL (ref 78–100)
MCV RBC AUTO: 90 FL (ref 78–100)
PLATELET # BLD AUTO: 152 10E3/UL (ref 150–450)
PLATELET # BLD AUTO: 236 10E3/UL (ref 150–450)
POTASSIUM BLD-SCNC: 4.4 MMOL/L (ref 3.4–5.3)
RBC # BLD AUTO: 3.64 10E6/UL (ref 4.4–5.9)
RBC # BLD AUTO: 4.91 10E6/UL (ref 4.4–5.9)
SODIUM SERPL-SCNC: 133 MMOL/L (ref 133–144)
WBC # BLD AUTO: 2.9 10E3/UL (ref 4–11)
WBC # BLD AUTO: 6.9 10E3/UL (ref 4–11)

## 2022-01-03 PROCEDURE — 36415 COLL VENOUS BLD VENIPUNCTURE: CPT | Performed by: STUDENT IN AN ORGANIZED HEALTH CARE EDUCATION/TRAINING PROGRAM

## 2022-01-03 PROCEDURE — 258N000003 HC RX IP 258 OP 636: Performed by: STUDENT IN AN ORGANIZED HEALTH CARE EDUCATION/TRAINING PROGRAM

## 2022-01-03 PROCEDURE — 85027 COMPLETE CBC AUTOMATED: CPT | Performed by: INTERNAL MEDICINE

## 2022-01-03 PROCEDURE — 250N000013 HC RX MED GY IP 250 OP 250 PS 637: Performed by: HOSPITALIST

## 2022-01-03 PROCEDURE — 250N000011 HC RX IP 250 OP 636: Performed by: INTERNAL MEDICINE

## 2022-01-03 PROCEDURE — 85027 COMPLETE CBC AUTOMATED: CPT | Performed by: STUDENT IN AN ORGANIZED HEALTH CARE EDUCATION/TRAINING PROGRAM

## 2022-01-03 PROCEDURE — 250N000013 HC RX MED GY IP 250 OP 250 PS 637: Performed by: INTERNAL MEDICINE

## 2022-01-03 PROCEDURE — 250N000009 HC RX 250: Performed by: HOSPITALIST

## 2022-01-03 PROCEDURE — 86140 C-REACTIVE PROTEIN: CPT | Performed by: INTERNAL MEDICINE

## 2022-01-03 PROCEDURE — 99232 SBSQ HOSP IP/OBS MODERATE 35: CPT | Performed by: STUDENT IN AN ORGANIZED HEALTH CARE EDUCATION/TRAINING PROGRAM

## 2022-01-03 PROCEDURE — 258N000003 HC RX IP 258 OP 636: Performed by: HOSPITALIST

## 2022-01-03 PROCEDURE — 36415 COLL VENOUS BLD VENIPUNCTURE: CPT | Performed by: INTERNAL MEDICINE

## 2022-01-03 PROCEDURE — 120N000001 HC R&B MED SURG/OB

## 2022-01-03 PROCEDURE — 82310 ASSAY OF CALCIUM: CPT | Performed by: INTERNAL MEDICINE

## 2022-01-03 RX ORDER — IBUPROFEN 600 MG/1
600 TABLET, FILM COATED ORAL EVERY 12 HOURS PRN
Status: DISCONTINUED | OUTPATIENT
Start: 2022-01-03 | End: 2022-01-04 | Stop reason: HOSPADM

## 2022-01-03 RX ADMIN — ENOXAPARIN SODIUM 40 MG: 40 INJECTION SUBCUTANEOUS at 20:31

## 2022-01-03 RX ADMIN — ASPIRIN 81 MG CHEWABLE TABLET 81 MG: 81 TABLET CHEWABLE at 09:16

## 2022-01-03 RX ADMIN — ACETAMINOPHEN 650 MG: 325 TABLET, FILM COATED ORAL at 16:22

## 2022-01-03 RX ADMIN — SODIUM CHLORIDE 50 ML: 9 INJECTION, SOLUTION INTRAVENOUS at 17:44

## 2022-01-03 RX ADMIN — ENOXAPARIN SODIUM 40 MG: 40 INJECTION SUBCUTANEOUS at 09:16

## 2022-01-03 RX ADMIN — BENZONATATE 100 MG: 100 CAPSULE ORAL at 05:51

## 2022-01-03 RX ADMIN — DEXAMETHASONE SODIUM PHOSPHATE 6 MG: 4 INJECTION, SOLUTION INTRAMUSCULAR; INTRAVENOUS at 05:48

## 2022-01-03 RX ADMIN — REMDESIVIR 100 MG: 100 INJECTION, POWDER, LYOPHILIZED, FOR SOLUTION INTRAVENOUS at 17:31

## 2022-01-03 RX ADMIN — SODIUM CHLORIDE 500 ML: 9 INJECTION, SOLUTION INTRAVENOUS at 14:47

## 2022-01-03 RX ADMIN — ACETAMINOPHEN 650 MG: 325 TABLET, FILM COATED ORAL at 23:12

## 2022-01-03 RX ADMIN — IBUPROFEN 600 MG: 600 TABLET ORAL at 05:50

## 2022-01-03 ASSESSMENT — ACTIVITIES OF DAILY LIVING (ADL)
ADLS_ACUITY_SCORE: 3
ADLS_ACUITY_SCORE: 5
ADLS_ACUITY_SCORE: 5
ADLS_ACUITY_SCORE: 3
ADLS_ACUITY_SCORE: 5
ADLS_ACUITY_SCORE: 5
ADLS_ACUITY_SCORE: 3
ADLS_ACUITY_SCORE: 5
ADLS_ACUITY_SCORE: 3
ADLS_ACUITY_SCORE: 3
ADLS_ACUITY_SCORE: 5
ADLS_ACUITY_SCORE: 3
ADLS_ACUITY_SCORE: 5
ADLS_ACUITY_SCORE: 3
ADLS_ACUITY_SCORE: 5
ADLS_ACUITY_SCORE: 3
ADLS_ACUITY_SCORE: 5
ADLS_ACUITY_SCORE: 3
ADLS_ACUITY_SCORE: 5
ADLS_ACUITY_SCORE: 5

## 2022-01-03 NOTE — PROGRESS NOTES
[facility]    Medicine Progress Note - Hospitalist Service       Date of Admission:  12/31/2021    Assessment & Plan             Adin Meade is a 26 year old male who presents with fever, sob    COVID-19 pneumonia  Not vaccinated. Tested positive 12/25, sx started ~5-6 days prior with headache, myalgias, cough. Minimal SOB.. D-dimer 0.92. WBC 5.5. CT without PE, evidence of multifocal pneumonia/pneumonitis.     > continue daily dexamethasone and remdesivir D4, will complete remdesivir tomorrow  > no fever for the first time today and patient finally beginning to feel symptomatically better however remains very dyspneic with any activity. Will continue supportive care in the hospital today with inhalers, IV remdesivir, cough suppressants and supplemental O2. Possible discharge tomorrow if continues to improve.  > nursing to assess for oxygen needs today    Addendum  Patient's labs returned with new JOVANNI.   - will give small fluid bolus and need to reduce patient's NSAID use. He has been taking 2-3 times a day since admission and reports he was also taking some prior to admission.  - patient's CBC is also abnormal and nursing reports that the lab may be in error. Redraw has been ordered. Follow results.    RODERICK, suspected  Morbid obesity  Patient does not tolerate CPAP but likely has sleep apnea. He reported apneic episode last night. Encourage weight loss and sleep study on discharge.    Type-II NSTEMI  Suspected type II NSTEMI 2/2 COVID   Troponin 101>94 on admission. EKG NSR.   - echocardiogram revealed normal LVE with LVH, no wall motions abnormalities    > follow on tele    Thrombocytopenia resolved  Platelets 139 on presentation. No recent for comparison. Suspect related to COVID  - monitor       Diet: Combination Diet Regular Diet Adult    DVT Prophylaxis: Enoxaparin (Lovenox) SQ  Calderon Catheter: Not present  Central Lines: None  Code Status: Full Code      Disposition Plan    Expected Discharge: Possibly tomorrow with or without oxygen    The patient's care was discussed with the Bedside Nurse and Patient.    Jocelyne Agosto DO  Hospitalist Service  [facility]  Securely message with the Vocera Web Console (learn more here)  Text page via [facility] Paging/Directory        Clinically Significant Risk Factors Present on Admission                 ______________________________________________________________________    Interval History   Patient feeling better today. No fever for the first time in several days. Mild headache but overall body aches and everything are better. No GI complaints. He stills feels very weak and dyspneic with any activity. Mild cough.     Data reviewed today: I reviewed all medications, new labs and imaging results over the last 24 hours. I personally reviewed normal EKG from admission.    Physical Exam   Vital Signs: Temp: 98  F (36.7  C) Temp src: Oral BP: 123/52 Pulse: 88   Resp: 20 SpO2: 96 % O2 Device: None (Room air) Oxygen Delivery: 4 LPM  Weight: 415 lbs 0 oz     General Appearance: Alert NAD   Respiratory:  Course breath sounds, No wheezing , distant breath sounds due to body habitus  Cardiovascular:  RRR no murmurs   GI:  Obese, soft NT/ND + BS   Skin:  Warm dry no acute rashes  Pysch: oriented X3,  normal speech pattern and thought process  a bit anxious still with a lot of questions    Data   Recent Labs   Lab 01/02/22  0611 01/01/22  1215 12/31/21  0255   WBC 6.9 6.1 5.5   HGB 14.2 14.2 13.9   MCV 84 84 86    171 139*    135 135  134   POTASSIUM 3.8 3.9 4.1  4.0   CHLORIDE 104 104 108  106   CO2 27 24 21  23   BUN 12 10 10  10   CR 0.76 0.80 0.88  0.89   ANIONGAP 4 7 6  5   SINCERE 8.4* 8.8 8.4*  8.4*   * 142* 111*  113*   ALBUMIN  --   --  3.5   PROTTOTAL  --   --  7.6   BILITOTAL  --   --  0.4   ALKPHOS  --   --  47   ALT  --   --  48   AST  --   --  40

## 2022-01-03 NOTE — PLAN OF CARE
Cognitive Concerns/ Orientation : A&Ox4   BEHAVIOR & AGGRESSION TOOL COLOR: Green  CIWA SCORE: NA    ABNL VS/O2: Max Temperature 99.9. Current Temp 98.2. other VSS on RA when awake. Oxygen 4L intermittently when sleeping or after having coughing spells.  MOBILITY: independent, steady. Calls appropriately  PAIN MANAGMENT: c/o headache and back pain, received prn Tylenol x1, and Advil x2  DIET: Regular  BOWEL/BLADDER: continent  ABNL LAB/BG: Covid19 positive 12/31/21, CT chest on 12/31, PE negative, showed evidence of multifocal pna or pneumonitis. CRP 56.3 (58.6),   DRAIN/DEVICES: PIV access x1 saline locked  TELEMETRY RHYTHM: NSR   SKIN: intact  TESTS/PROCEDURES: none  D/C DATE: TBD  OTHER IMPORTANT INFO: Special precautions. On IV dexamethasone, lovenox subcutaneous and IV Remdesivir. Received Tessalon x2 for coughing.   IS THE PATIENT ON REMDESIVIR? DATE OF LAST SCHEDULED DOSE: 1/4/21

## 2022-01-03 NOTE — PROGRESS NOTES
Patient has been assessed for Home Oxygen needs.     Pulse oximetry (SpO2) and Oxygen flow readings:    SpO2 = 92% on room air at rest while awake.    SpO2 improved to 99% on 2 liters/minute at rest.    SpO2 = 86% on room air during activity/with exercise.    *SpO2 improved to 95% on 2 liters/minute during activity/with exercise.

## 2022-01-03 NOTE — PLAN OF CARE
DATE & TIME: 1/3/22 [id number]     Cognitive Concerns/ Orientation : A&Ox4   BEHAVIOR & AGGRESSION TOOL COLOR: Green  CIWA SCORE: NA    ABNL VS/O2: VSS on RA when awake. Oxygen 4L intermittently when sleepingMOBILITY: independent, steady. Calls appropriately  PAIN MANAGMENT: Denies  DIET: Regular  BOWEL/BLADDER: continent  ABNL LAB/BG: Covid19 positive 12/31/21, CT chest on 12/31, PE negative, showed evidence of multifocal pna or pneumonitis.  DRAIN/DEVICES: PIV access x1 saline locked  TELEMETRY RHYTHM: NSR   SKIN: intact  TESTS/PROCEDURES: none  D/C DATE: 1/4/22  OTHER IMPORTANT INFO: Special precautions. On IV dexamethasone, lovenox subcutaneous and IV Remdesivir.  IS THE PATIENT ON REMDESIVIR? DATE OF LAST SCHEDULED DOSE: 1/4/21

## 2022-01-03 NOTE — PLAN OF CARE
Summary: Covid19 pneumonia, Elevated Trop   DATE & TIME: 1/3/22 [id number]     Cognitive Concerns/ Orientation : A&Ox4   BEHAVIOR & AGGRESSION TOOL COLOR: Green  CIWA SCORE: NA    ABNL VS/O2: /74, 140/69, O2 intermittently drops to 88% while asleep, suspected RODERICK, needing 3-4L. Mid 90% on RA at rest and awake.   MOBILITY: independent, steady. Calls appropriately  PAIN MANAGMENT: c/o headache and back pain 6-9/10, received prn Tylenol x2, MD ordered one time dose of Oxycodone 5 mg for pain, pt declined.   DIET: Regular  BOWEL/BLADDER: continent  ABNL LAB/BG: Covid19 positive 12/31/21, CT chest on 12/31, PE negative, showed evidence of multifocal pna or pneumonitis. CRP 56.3 (58.6),   DRAIN/DEVICES: PIV access x1 saline locked   TELEMETRY RHYTHM: NSR   SKIN: intact  TESTS/PROCEDURES: none  D/C DATE: TBD  OTHER IMPORTANT INFO: Special precautions. On IV dexamethasone, lovenox subcutaneous and IV Remdesivir. Received Tessalon x1 for coughing.   IS THE PATIENT ON REMDESIVIR? DATE OF LAST SCHEDULED DOSE: 1/4/21

## 2022-01-04 ENCOUNTER — DOCUMENTATION ONLY (OUTPATIENT)
Dept: HOME HEALTH SERVICES | Facility: CLINIC | Age: 27
End: 2022-01-04
Payer: COMMERCIAL

## 2022-01-04 VITALS
HEART RATE: 83 BPM | RESPIRATION RATE: 22 BRPM | TEMPERATURE: 98.1 F | BODY MASS INDEX: 53.28 KG/M2 | OXYGEN SATURATION: 95 % | WEIGHT: 315 LBS | DIASTOLIC BLOOD PRESSURE: 78 MMHG | SYSTOLIC BLOOD PRESSURE: 150 MMHG

## 2022-01-04 LAB
ANION GAP SERPL CALCULATED.3IONS-SCNC: 5 MMOL/L (ref 3–14)
BUN SERPL-MCNC: 12 MG/DL (ref 7–30)
CALCIUM SERPL-MCNC: 8.5 MG/DL (ref 8.5–10.1)
CHLORIDE BLD-SCNC: 102 MMOL/L (ref 94–109)
CO2 SERPL-SCNC: 26 MMOL/L (ref 20–32)
CREAT SERPL-MCNC: 0.75 MG/DL (ref 0.66–1.25)
CRP SERPL-MCNC: 32.8 MG/L (ref 0–8)
ERYTHROCYTE [DISTWIDTH] IN BLOOD BY AUTOMATED COUNT: 12.8 % (ref 10–15)
GFR SERPL CREATININE-BSD FRML MDRD: >90 ML/MIN/1.73M2
GLUCOSE BLD-MCNC: 115 MG/DL (ref 70–99)
HCT VFR BLD AUTO: 41.2 % (ref 40–53)
HGB BLD-MCNC: 13.6 G/DL (ref 13.3–17.7)
MCH RBC QN AUTO: 27.5 PG (ref 26.5–33)
MCHC RBC AUTO-ENTMCNC: 33 G/DL (ref 31.5–36.5)
MCV RBC AUTO: 83 FL (ref 78–100)
PLATELET # BLD AUTO: 249 10E3/UL (ref 150–450)
POTASSIUM BLD-SCNC: 3.5 MMOL/L (ref 3.4–5.3)
RBC # BLD AUTO: 4.94 10E6/UL (ref 4.4–5.9)
SODIUM SERPL-SCNC: 133 MMOL/L (ref 133–144)
WBC # BLD AUTO: 9.3 10E3/UL (ref 4–11)

## 2022-01-04 PROCEDURE — 86140 C-REACTIVE PROTEIN: CPT | Performed by: INTERNAL MEDICINE

## 2022-01-04 PROCEDURE — 258N000003 HC RX IP 258 OP 636: Performed by: HOSPITALIST

## 2022-01-04 PROCEDURE — 36415 COLL VENOUS BLD VENIPUNCTURE: CPT | Performed by: INTERNAL MEDICINE

## 2022-01-04 PROCEDURE — 80048 BASIC METABOLIC PNL TOTAL CA: CPT | Performed by: INTERNAL MEDICINE

## 2022-01-04 PROCEDURE — 250N000011 HC RX IP 250 OP 636: Performed by: INTERNAL MEDICINE

## 2022-01-04 PROCEDURE — 250N000013 HC RX MED GY IP 250 OP 250 PS 637: Performed by: INTERNAL MEDICINE

## 2022-01-04 PROCEDURE — 99239 HOSP IP/OBS DSCHRG MGMT >30: CPT | Performed by: INTERNAL MEDICINE

## 2022-01-04 PROCEDURE — 85027 COMPLETE CBC AUTOMATED: CPT | Performed by: INTERNAL MEDICINE

## 2022-01-04 PROCEDURE — XW033E5 INTRODUCTION OF REMDESIVIR ANTI-INFECTIVE INTO PERIPHERAL VEIN, PERCUTANEOUS APPROACH, NEW TECHNOLOGY GROUP 5: ICD-10-PCS | Performed by: INTERNAL MEDICINE

## 2022-01-04 PROCEDURE — 250N000013 HC RX MED GY IP 250 OP 250 PS 637: Performed by: HOSPITALIST

## 2022-01-04 PROCEDURE — 250N000009 HC RX 250: Performed by: HOSPITALIST

## 2022-01-04 RX ORDER — DEXAMETHASONE 6 MG/1
6 TABLET ORAL
Qty: 4 TABLET | Refills: 0 | Status: ON HOLD | OUTPATIENT
Start: 2022-01-04 | End: 2022-01-13

## 2022-01-04 RX ORDER — DEXAMETHASONE 4 MG/1
6 TABLET ORAL 2 TIMES DAILY WITH MEALS
Qty: 12 TABLET | Refills: 0 | Status: SHIPPED | OUTPATIENT
Start: 2022-01-04 | End: 2022-01-04

## 2022-01-04 RX ORDER — BENZONATATE 100 MG/1
100 CAPSULE ORAL 3 TIMES DAILY PRN
Qty: 30 CAPSULE | Refills: 0 | Status: ON HOLD | OUTPATIENT
Start: 2022-01-04 | End: 2022-01-13

## 2022-01-04 RX ADMIN — ACETAMINOPHEN 650 MG: 325 TABLET, FILM COATED ORAL at 12:03

## 2022-01-04 RX ADMIN — BENZONATATE 100 MG: 100 CAPSULE ORAL at 00:07

## 2022-01-04 RX ADMIN — ENOXAPARIN SODIUM 40 MG: 40 INJECTION SUBCUTANEOUS at 08:14

## 2022-01-04 RX ADMIN — REMDESIVIR 100 MG: 100 INJECTION, POWDER, LYOPHILIZED, FOR SOLUTION INTRAVENOUS at 14:38

## 2022-01-04 RX ADMIN — DEXAMETHASONE SODIUM PHOSPHATE 6 MG: 4 INJECTION, SOLUTION INTRAMUSCULAR; INTRAVENOUS at 05:16

## 2022-01-04 RX ADMIN — ACETAMINOPHEN 650 MG: 325 TABLET, FILM COATED ORAL at 05:16

## 2022-01-04 ASSESSMENT — ACTIVITIES OF DAILY LIVING (ADL)
ADLS_ACUITY_SCORE: 5
ADLS_ACUITY_SCORE: 5
ADLS_ACUITY_SCORE: 3
ADLS_ACUITY_SCORE: 5
ADLS_ACUITY_SCORE: 3
ADLS_ACUITY_SCORE: 5
ADLS_ACUITY_SCORE: 5
ADLS_ACUITY_SCORE: 3
ADLS_ACUITY_SCORE: 5
ADLS_ACUITY_SCORE: 3
ADLS_ACUITY_SCORE: 5
ADLS_ACUITY_SCORE: 3
ADLS_ACUITY_SCORE: 3
ADLS_ACUITY_SCORE: 5

## 2022-01-04 NOTE — PLAN OF CARE
"DATE & TIME: 1/3/22 [id number]     Cognitive Concerns/ Orientation : A&Ox4   BEHAVIOR & AGGRESSION TOOL COLOR: Green  CIWA SCORE: NA    ABNL VS/O2: VSS on RA, Pt feeling SOB at rest and put his oxygen, oximyzer cannula 4L that was set for him. Stating \"I am feeling better\"  O2 sat 91 RA, 93- 95 in 4L.  MOBILITY: independent, steady. Calls appropriately  PAIN MANAGMENT:Headache, tylenol given.  DIET: Regular,   BOWEL/BLADDER: continent  ABNL LAB/BG: Covid19 positive 12/31/21, CT chest on 12/31, PE negative, showed evidence of multifocal pna or pneumonitis.   DRAIN/DEVICES: PIV access x1 saline locked, int. Remd.  TELEMETRY RHYTHM: NSR, Removed the telemetry box and refused it  back.  SKIN: intact  TESTS/PROCEDURES: none  D/C DATE: 1/4/22, Pt expressing fear of leaving due to SOB & decreasing of oxygen.  OTHER IMPORTANT INFO: Special precautions. On IV dexamethasone, lovenox subcutaneous and IV Remdesivir.  IS THE PATIENT ON REMDESIVIR? DATE OF LAST SCHEDULED DOSE: 1/4/21       "

## 2022-01-04 NOTE — PLAN OF CARE
+Covid. Up IND in room. A&Ox4. VSS 4L 95%. Desat to 85% with ambulation. Rebounds quickly. ALVA. LS with expiratory wheeze, diminished. Persistent cough managed with prn tessalon. C/o persistent HA managed with prn Tylenol. Refusing tele. Reg diet. Plan discharge pending progress.

## 2022-01-04 NOTE — PROGRESS NOTES
I certify that this patient, Adin Meade has been under my care (or a nurse practitioner or physican's assistant working with me). This is the face-to-face encounter for oxygen medical necessity.      Adin Meade is now in a chronic stable state and continues to require supplemental oxygen. Patient has continued oxygen desaturation due to COVID 19 pneumonia.    Alternative treatment(s) tried or considered and deemed clinically infective for treatment of COVID 19 pneumonia include inhalers and steroids.  If portability is ordered, is the patient mobile within the home? yes    **Patients who qualify for home O2 coverage under the CMS guidelines require ABG tests or O2 sat readings obtained closest to, but no earlier than 2 days prior to the discharge, as evidence of the need for home oxygen therapy. Testing must be performed while patient is in the chronic stable state. See notes for O2 sats.**

## 2022-01-04 NOTE — PLAN OF CARE
Discharge    Patient discharged to home via w/c with O2, dad to .     Listed belongings gathered and given to patient (including from security/pharmacy). Yes  Care Plan and Patient education resolved: Yes  Prescriptions if needed, hard copies sent with patient  NA  Medication Bin checked and emptied on discharge Yes  SW/care coordinator/charge RN aware of discharge: Yes    SUMMARY: COVID +/Pneu/NSTEMI   DATE & TIME: 01/04/22    Cognitive Concerns/ Orientation : A&Ox4, calm.   BEHAVIOR & AGGRESSION TOOL COLOR: Green  CIWA SCORE: NA   ABNL VS/O2: VSS x -150's. On 1L O2 via NC.   MOBILITY: Ind in room, up to BR, enc to sit in chair/OOB activity.  PAIN MANAGMENT: C/O h/a, PRN tylenol effective.   DIET: Regular, good appetite.   BOWEL/BLADDER: Continent b/b.   ABNL LAB/BG: CRP 32.8.   DRAIN/DEVICES: L PIV SL.   TELEMETRY RHYTHM: Refused.  SKIN: Intact per pt report.   TESTS/PROCEDURES: NA  D/C DATE: Tonight once Meds are filled and O2 is here.   Discharge Barriers: O2, remdesivir.  OTHER IMPORTANT INFO: LS dim, using IS, ALVA, desats with activity, receiving decadron, lovenox, remdesivir.   IS THE PATIENT ON REMDESIVIR? Yes DATE OF LAST SCHEDULED DOSE: Now.      [facility]

## 2022-01-04 NOTE — PROGRESS NOTES
1415:  Initial O2 intake received.  Reviewed chart and all docs are present to dispense.   1445:  Called and spoke with nurse on 6th floor and let her know that we received all docs and will speak with patient and deliver the poc for discharge.    1446:  Spoke with patient and offered choice.  Pt agrees to [facility] servicing him for O2.  Let him know we will deliver POC for discharge.  Pt understands... and agrees to nurse signing for him for equipment.  Also, pt states he does have insurance through [facility].  I will call [facility] and get that updated in chart.

## 2022-01-04 NOTE — DISCHARGE INSTRUCTIONS
Oxygen Provider:  Arranged through [employer], contact number [phone].  If you have any questions or concerns please call the oxygen company directly.    Follow-up on Tuesday Jan 25th at 9AM at [facility]

## 2022-01-04 NOTE — PROGRESS NOTES
Patient has been assessed for Home Oxygen needs.     Pulse oximetry (SpO2) and Oxygen flow readings:    SpO2 = 92% on room air at rest while awake.    SpO2 improved to 94% on 1 liters/minute at rest.    SpO2 = 87% on room air during activity/with exercise.    *SpO2 improved to 90% on 1 liters/minute during activity/with exercise.

## 2022-01-04 NOTE — DISCHARGE SUMMARY
[facility]  Hospitalist Discharge Summary      Date of Admission:  12/31/2021  Date of Discharge:  1/4/2022  Discharging Provider: Alfredito Dickinson,       Discharge Diagnoses   COVID-19 pneumonia w/acute hypoxic respiratory failure   RODERICK, suspected  Morbid obesity  Type-II NSTEMI  Thrombocytopenia  JOVANNI     Follow-ups Needed After Discharge   Follow-up Appointments     Follow-up and recommended labs and tests       Follow up with primary care provider, Physician No Ref-Primary, within   2-4 weeks, for hospital follow- up. The following labs/tests are   recommended: Re-assess O2 requirements           Unresulted Labs Ordered in the Past 30 Days of this Admission     No orders found from 12/1/2021 to 1/1/2022.        Discharge Disposition   Discharged to home  Condition at discharge: Stable    Hospital Course   Adin Meade is a 26 year old male who presents with fever, sob     COVID-19 pneumonia  Not vaccinated. Tested positive 12/25, sx started ~5-6 days prior with headache, myalgias, cough. Minimal SOB.. D-dimer 0.92. WBC 5.5. CT without PE, evidence of multifocal pneumonia/pneumonitis.   > continue daily dexamethasone and remdesivir D4  > Home oxygen evaluation.  Needs 1 L with exertion.  Discussed discharging with home O2 and patient is agreeable     JOVANNI   Suspected pre-renal.  Given IVF and improved      RODERICK, suspected  Morbid obesity  Patient does not tolerate CPAP but likely has sleep apnea. He reported apneic episode last night. Encourage weight loss and sleep study on discharge.     Type-II NSTEMI  Suspected type II NSTEMI 2/2 COVID   Troponin 101>94 on admission. EKG NSR.   - echocardiogram revealed normal LVE with LVH, no wall motions abnormalities     Thrombocytopenia resolved  Platelets 139 on presentation. No recent for comparison. Suspect related to COVID  - monitor    Consultations This Hospital Stay   SMOKING CESSATION PROGRAM IP CONSULT    Code Status   Full  Code    Time Spent on this Encounter   I, Alfredito Dickinson DO, personally saw the patient today and spent greater than 30 minutes discharging this patient.       Alfredito Dickinson DO  [place] [address] [facility]  [address] [place] [address]  Phone: [phone]  ______________________________________________________________________    Physical Exam   Vital Signs: Temp: 98.1  F (36.7  C) Temp src: Oral BP: (!) 150/78 Pulse: 93   Resp: 22 SpO2: 93 % O2 Device: Nasal cannula Oxygen Delivery: 1 LPM  Weight: 415 lbs 0 oz  General Appearance: Resting comfortably.  NAD   Respiratory: No respiratory distress on 1 L.  No respiratory distress   Cardiovascular: RRR.  Appears well perfused   GI: Soft.  Non-distended  Skin: No obvious rashes or cyanosis  Other: Alert.  Moving all extremities grossly         Primary Care Physician   Physician No Ref-Primary    Discharge Orders      COVID-19 GetWell Loop Referral      Reason for your hospital stay    COVID 19 pneumonia     Follow-up and recommended labs and tests     Follow up with primary care provider, Physician No Ref-Primary, within 2-4 weeks, for hospital follow- up. The following labs/tests are recommended: Re-assess O2 requirements     Contact provider    Contact your primary care provider if If increased trouble breathing, new arm/leg swelling, dizziness/passing out, falls, bleeding that doesn't stop, or uncontrolled pain.     Discharge - Quarantine/Isolation Instruction    Date of symptom onset:     Date of first positive test:    If you tested positive COVID-19 and show symptoms (fever, cough, body aches or trouble breathing):        Stay home and away from others (self-isolate) until:        At least 10 days have passed since your symptoms started. AND...        You've had no fever-and no medicine that reduces fever for 1 full day (24 hours). AND...         Your other symptoms have resolved (gotten better).  If you tested positive  for COVID-19 but don't show symptoms:       Stay home and away from others (self-isolate) until at least 10 days have passed since the date of your first positive COVID-19 test.     Activity    Your activity upon discharge: activity as tolerated     Oxygen Adult/Peds    Oxygen Documentation:   I certify that this patient, Adin Meade has been under my care (or a nurse practitioner or physican's assistant working with me). This is the face-to-face encounter for oxygen medical necessity.      Adin Meade is now in a chronic stable state and continues to require supplemental oxygen. Patient has continued oxygen desaturation due to COVID 19 pneumonia.    Alternative treatment(s) tried or considered and deemed clinically infective for treatment of COVID 19 pneumonia include inhalers, steroids, and IS  .  If portability is ordered, is the patient mobile within the home? yes    **Patients who qualify for home O2 coverage under the CMS guidelines require ABG tests or O2 sat readings obtained closest to, but no earlier than 2 days prior to the discharge, as evidence of the need for home oxygen therapy. Testing must be performed while patient is in the chronic stable state. See notes for O2 sats.**     Diet    Follow this diet upon discharge: Orders Placed This Encounter      Combination Diet Regular Diet Adult       Significant Results and Procedures   Most Recent 3 CBC's:Recent Labs   Lab Test 01/04/22  0802 01/03/22  1821 01/03/22  1258   WBC 9.3 6.9 2.9*   HGB 13.6 13.7 11.1*   MCV 83 84 90    236 152     Most Recent 3 BMP's:Recent Labs   Lab Test 01/04/22  0802 01/03/22  1258 01/02/22  0611    133 135   POTASSIUM 3.5 4.4 3.8   CHLORIDE 102 103 104   CO2 26 25 27   BUN 12 20 12   CR 0.75 1.23 0.76   ANIONGAP 5 5 4   SINCERE 8.5 7.1* 8.4*   * 343* 106*   ,   Results for orders placed or performed during the hospital encounter of 12/31/21   CT Chest Pulmonary Embolism w Contrast    Narrative    EXAM:  CT CHEST PULMONARY EMBOLISM W CONTRAST  LOCATION: [facility]  DATE/TIME: 2021 4:16 AM    INDICATION: PE suspected, low/intermediate prob, positive D-dimer  COMPARISON: None.  TECHNIQUE: CT chest pulmonary angiogram during arterial phase injection of IV contrast. Multiplanar reformats and MIP reconstructions were performed. Dose reduction techniques were used.   CONTRAST: 83mL Isovue-370    FINDINGS:  ANGIOGRAM CHEST: Pulmonary arteries are normal caliber and negative for pulmonary emboli. Thoracic aorta is negative for dissection. No CT evidence of right heart strain.    LUNGS AND PLEURA: Patchy multifocal airspace infiltrates involving all lobes of both lungs. No pneumothorax or pleural effusion.    MEDIASTINUM/AXILLAE: Heart size is normal. No pericardial effusion.    CORONARY ARTERY CALCIFICATION: None.    UPPER ABDOMEN: Normal.    MUSCULOSKELETAL: Normal.      Impression    IMPRESSION:  1.  Negative for pulmonary embolism.    2.  Evidence of multifocal pneumonia or pneumonitis.   Echo Limited     Value    LVEF  55-60%    Narrative    [id number]  [id number]  [id number]  [id number]^YURI^MELISSA^ARABELLA     [facility]  Echocardiography Laboratory  [address] [place] [address]     Name: MADHAVI ROBERSON  MRN: [id number]  : 1995  Study Date: 2021 02:05 PM  Age: 26 yrs  Gender: Male  Patient Location: [facility]  Reason For Study: Chest Pain, Chest Tightness, Chest Pressure  Ordering Physician: MELISSA MILLAN  Referring Physician: Melissa Millan  Performed By: Jesse Davenport     BSA: 3.0 m2  Height: 74 in  Weight: 415 lb  HR: 92  BP: 144/81 mmHg  ______________________________________________________________________________  Procedure  Limited Portable Echo Adult. Optison (NDC #[id number]) given intravenously.  ______________________________________________________________________________  Interpretation Summary     The left ventricle is normal in  size.  There is mild concentric left ventricular hypertrophy.  Left ventricular systolic function is normal.  The visual ejection fraction is 55-60%.  No regional wall motion abnormalities noted.     Valvular structures not seen well.  Use of IV contrast allowed for adequate assessment of wall motion and LV  function. Technically difficult, suboptimal study. There is no comparison  study available.  ______________________________________________________________________________  Left Ventricle  The left ventricle is normal in size. There is mild concentric left  ventricular hypertrophy. Left ventricular systolic function is normal. The  visual ejection fraction is 55-60%. No regional wall motion abnormalities  noted.     Right Ventricle  The right ventricle is normal in structure, function and size.     Atria  Normal left atrial size. Right atrial size is normal. There is no atrial shunt  seen.     Mitral Valve  The mitral valve is normal in structure and function. There is trace mitral  regurgitation.     Tricuspid Valve  The tricuspid valve is normal in structure and function. There is trace  tricuspid regurgitation. Right ventricular systolic pressure could not be  approximated due to inadequate tricuspid regurgitation.     Aortic Valve  The aortic valve is normal in structure and function. No aortic regurgitation  is present. No aortic stenosis is present.     Pulmonic Valve  The pulmonic valve is not well seen, but is grossly normal. There is trace  pulmonic valvular regurgitation.     Vessels  Normal size aorta. The inferior vena cava was normal in size with preserved  respiratory variability.     Pericardium  There is no pericardial effusion.     Rhythm  Sinus rhythm was noted.  ______________________________________________________________________________  MMode/2D Measurements & Calculations  IVSd: 1.3 cm     LVIDd: 5.5 cm  LVIDs: 3.7 cm  LVPWd: 1.3 cm  FS: 33.1 %  LV mass(C)d: 290.0 grams  LV mass(C)dI:  97.8 grams/m2  LA dimension: 4.7 cm  asc Aorta Diam: 2.9 cm  RWT: 0.46     Doppler Measurements & Calculations  TR max chavo: 154.7 cm/sec  TR max P.6 mmHg     ______________________________________________________________________________  Report approved by: Jerome Cerrato 2021 03:18 PM               Discharge Medications   Current Discharge Medication List      START taking these medications    Details   benzonatate (TESSALON) 100 MG capsule Take 1 capsule (100 mg) by mouth 3 times daily as needed for cough  Qty: 30 capsule, Refills: 0    Comments: Future refills by PCP  Physician No Ref-Primary with phone number None.  Associated Diagnoses: Pneumonia due to  novel coronavirus      dexamethasone (DECADRON) 6 MG tablet Take 1 tablet (6 mg) by mouth daily (with breakfast) for 4 days  Qty: 4 tablet, Refills: 0    Associated Diagnoses: Pneumonia due to 2019 novel coronavirus      rivaroxaban ANTICOAGULANT (XARELTO) 10 MG TABS tablet Take 1 tablet (10 mg) by mouth daily (with dinner)  Qty: 30 tablet, Refills: 0    Associated Diagnoses: Pneumonia due to 2019 novel coronavirus         CONTINUE these medications which have NOT CHANGED    Details   ibuprofen (ADVIL/MOTRIN) 200 MG capsule Take 600-800 mg by mouth every 6 hours as needed for fever      doxycycline hyclate (VIBRA-TABS) 100 MG tablet Take 1 tablet (100 mg) by mouth 2 times daily  Qty: 20 tablet, Refills: 1    Associated Diagnoses: Infected sebaceous cyst of skin           Allergies   No Known Allergies

## 2022-01-05 ENCOUNTER — PATIENT OUTREACH (OUTPATIENT)
Dept: CARE COORDINATION | Facility: CLINIC | Age: 27
End: 2022-01-05
Payer: COMMERCIAL

## 2022-01-05 ENCOUNTER — NURSE TRIAGE (OUTPATIENT)
Dept: NURSING | Facility: CLINIC | Age: 27
End: 2022-01-05
Payer: COMMERCIAL

## 2022-01-05 DIAGNOSIS — Z71.89 OTHER SPECIFIED COUNSELING: ICD-10-CM

## 2022-01-05 NOTE — PROGRESS NOTES
Clinic Care Coordination Contact  [facility]: Post-Discharge Note  SITUATION                                                      Admission:    Admission Date: 12/31/21   Reason for Admission: COVID-19 pneumonia w/acute hypoxic respiratory failure  Discharge:   Discharge Date: 01/04/22  Discharge Diagnosis: COVID-19 pneumonia w/acute hypoxic respiratory failure    BACKGROUND                                                      Adin Meade is a 26 year old male who presents with fevers, cough, headache, myalgias, weakness.  He reports that symptoms presented on approximately December 24 with cold sweats.  He states over time the symptoms of gotten progressively worse.  He describes a headache as well as diffuse myalgias.  He has had a cough.  He also notes he has had profound fatigue and has basically slept for 3 days.  He states he has slight chest pain which he thinks is secondary to anxiety.  Denies any abdominal pain.  He had no nausea vomiting but does endorse some diarrhea.  He does not know if he had Covid contacts.  He states he has not been vaccinated for Covid but is interested now.    ASSESSMENT      Enrollment  Primary Care Care Coordination Status: Not a Candidate    Discharge Assessment  How are you doing now that you are home?: Not feeling that great. I feel like I discharged to soon and I feel like I should go back to the ED. I have been having problems with my oxygen.  How are your symptoms? (Red Flag symptoms escalate to triage hotline per guidelines): Worsening  Do you feel your condition is stable enough to be safe at home until your provider visit?: No (see comment) (CHW connected patient with a triage nurse so patient could ask nurse some questions. Triage nurse advised patient to go back into the ED.)  Does the patient have their discharge instructions? : Yes  Does the patient have questions regarding their discharge instructions? : No  Were you started on any new medications or were  there changes to any of your previous medications? : Yes  Does the patient have all of their medications?: Yes  Do you have questions regarding any of your medications? : No  Do you have all of your needed medical supplies or equipment (DME)?  (i.e. oxygen tank, CPAP, cane, etc.): Yes  Discharge follow-up appointment scheduled within 14 calendar days? : No  Is patient agreeable to assistance with scheduling? : No                  PLAN                                                      Outpatient Plan: Follow up with primary care provider, Physician No Ref-Primary, within 2-4 weeks, for hospital follow- up. The following  labs/tests are recommended: Re-assess O2 requirements    No future appointments.      For any urgent concerns, please contact our 24 hour nurse triage line: [phone] ([phone])         ANGELI Harris  [phone]  Connected Care Resource [facility]

## 2022-01-05 NOTE — TELEPHONE ENCOUNTER
Nurse Triage SBAR    Is this a 2nd Level Triage? NO    Situation:    Patient calling, discharged from the hospital yesterday with COVID-19.     Background:     Discharged from [facility] yesterday.    Assessment :    Patient stating he is having a hard time keeping his oxygen saturation > 90  Patient states currently he is having intermittent chest pressure and moderate difficulties with breathing (SOB at rest)  Cough, consistent.     Recommendation:    Per protocol, recommendations are for patient to ED now.    Patient will go now and agreed with disposition. Advised patient to call back with any new or worsening sx. Patient verbalized understanding and agrees with plan.     Protocol Recommended Disposition: Emergency department    Gwen Bernstein RN, BSN Nurse Triage Advisor 11:26 AM 1/5/2022     Reason for Disposition    MODERATE difficulty breathing (e.g., speaks in phrases, SOB even at rest, pulse 100-120)    Additional Information    Negative: SEVERE difficulty breathing (e.g., struggling for each breath, speaks in single words)    Negative: Difficult to awaken or acting confused (e.g., disoriented, slurred speech)    Negative: Bluish (or gray) lips or face now    Negative: Shock suspected (e.g., cold/pale/clammy skin, too weak to stand, low BP, rapid pulse)    Negative: Sounds like a life-threatening emergency to the triager    Negative: [1] COVID-19 exposure AND [2] no symptoms    Negative: COVID-19 vaccine reaction suspected (e.g., fever, headache, muscle aches) occurring 1 to 3 days after getting vaccine    Negative: COVID-19 vaccine, questions about    Negative: [1] Lives with someone known to have influenza (flu test positive) AND [2] flu-like symptoms (e.g., cough, runny nose, sore throat, SOB; with or without fever)    Negative: [1] Adult with possible COVID-19 symptoms AND [2] triager concerned about severity of symptoms or other causes    Negative: COVID-19 and breastfeeding, questions  about    Negative: SEVERE or constant chest pain or pressure (Exception: mild central chest pain, present only when coughing)    Protocols used: CORONAVIRUS (COVID-19) DIAGNOSED OR [id number] 8.25.2021    COVID 19 Nurse Triage Plan/Patient Instructions    Please be aware that novel coronavirus (COVID-19) may be circulating in the community. If you develop symptoms such as fever, cough, or SOB or if you have concerns about the presence of another infection including coronavirus (COVID-19), please contact your health care provider or visit [email or web address].     Disposition/Instructions    ED Visit recommended. Follow protocol based instructions.     Bring Your Own Device:  Please also bring your smart device(s) (smart phones, tablets, laptops) and their charging cables for your personal use and to communicate with your care team during your visit.    Thank you for taking steps to prevent the spread of this virus.  o Limit your contact with others.  o Wear a simple mask to cover your cough.  o Wash your hands well and often.    Resources    [facility]: About COVID-19: [email or web address]    CDC: What to Do If You're Sick: www.cdc.gov/coronavirus/2019-ncov/about/steps-when-sick.html    CDC: Ending Home Isolation: www.cdc.gov/coronavirus/2019-ncov/hcp/disposition-in-home-patients.html     CDC: Caring for Someone: www.cdc.gov/coronavirus/2019-ncov/if-you-are-sick/care-for-someone.html     [facility]: Interim Guidance for Hospital Discharge to Home: www.health.[facility].[place]/diseases/coronavirus/hcp/hospdischarge.pdf    [facility] clinical trials (COVID-19 research studies): [email or web address]/umn-clinical-trials     Below are the COVID-19 hotlines at the [place] Department of Health ([facility]). Interpreters are available.   o For health questions: Call 197-798-1679 or [phone] (7 a.m. to 7 p.m.)  o For questions about schools and childcare: Call 461-296-1709 or [phone]  (7 a.m. to 7 p.m.)

## 2022-01-10 ENCOUNTER — NURSE TRIAGE (OUTPATIENT)
Dept: NURSING | Facility: CLINIC | Age: 27
End: 2022-01-10
Payer: COMMERCIAL

## 2022-01-10 ENCOUNTER — HOSPITAL ENCOUNTER (INPATIENT)
Facility: CLINIC | Age: 27
LOS: 2 days | Discharge: HOME | End: 2022-01-13
Attending: EMERGENCY MEDICINE | Admitting: HOSPITALIST
Payer: COMMERCIAL

## 2022-01-10 ENCOUNTER — APPOINTMENT (OUTPATIENT)
Dept: CT IMAGING | Facility: CLINIC | Age: 27
End: 2022-01-10
Attending: EMERGENCY MEDICINE
Payer: COMMERCIAL

## 2022-01-10 DIAGNOSIS — U07.1 PNEUMONIA DUE TO 2019 NOVEL CORONAVIRUS: ICD-10-CM

## 2022-01-10 DIAGNOSIS — J12.82 PNEUMONIA DUE TO 2019 NOVEL CORONAVIRUS: ICD-10-CM

## 2022-01-10 LAB
ALBUMIN SERPL-MCNC: 3.1 G/DL (ref 3.4–5)
ALP SERPL-CCNC: 45 U/L (ref 40–150)
ALT SERPL W P-5'-P-CCNC: 62 U/L (ref 0–70)
ANION GAP SERPL CALCULATED.3IONS-SCNC: 9 MMOL/L (ref 3–14)
AST SERPL W P-5'-P-CCNC: 27 U/L (ref 0–45)
BASOPHILS # BLD AUTO: 0.1 10E3/UL (ref 0–0.2)
BASOPHILS NFR BLD AUTO: 1 %
BILIRUB SERPL-MCNC: 0.4 MG/DL (ref 0.2–1.3)
BUN SERPL-MCNC: 15 MG/DL (ref 7–30)
CALCIUM SERPL-MCNC: 8.8 MG/DL (ref 8.5–10.1)
CHLORIDE BLD-SCNC: 105 MMOL/L (ref 94–109)
CO2 SERPL-SCNC: 22 MMOL/L (ref 20–32)
CREAT SERPL-MCNC: 0.8 MG/DL (ref 0.66–1.25)
EOSINOPHIL # BLD AUTO: 0.1 10E3/UL (ref 0–0.7)
EOSINOPHIL NFR BLD AUTO: 1 %
ERYTHROCYTE [DISTWIDTH] IN BLOOD BY AUTOMATED COUNT: 12.8 % (ref 10–15)
GFR SERPL CREATININE-BSD FRML MDRD: >90 ML/MIN/1.73M2
GLUCOSE BLD-MCNC: 105 MG/DL (ref 70–99)
HCT VFR BLD AUTO: 43.6 % (ref 40–53)
HGB BLD-MCNC: 14.3 G/DL (ref 13.3–17.7)
HOLD SPECIMEN: NORMAL
HOLD SPECIMEN: NORMAL
IMM GRANULOCYTES # BLD: 0.7 10E3/UL
IMM GRANULOCYTES NFR BLD: 4 %
LACTATE SERPL-SCNC: 1 MMOL/L (ref 0.7–2)
LYMPHOCYTES # BLD AUTO: 2.6 10E3/UL (ref 0.8–5.3)
LYMPHOCYTES NFR BLD AUTO: 14 %
MCH RBC QN AUTO: 27.9 PG (ref 26.5–33)
MCHC RBC AUTO-ENTMCNC: 32.8 G/DL (ref 31.5–36.5)
MCV RBC AUTO: 85 FL (ref 78–100)
MONOCYTES # BLD AUTO: 1.9 10E3/UL (ref 0–1.3)
MONOCYTES NFR BLD AUTO: 10 %
NEUTROPHILS # BLD AUTO: 13.5 10E3/UL (ref 1.6–8.3)
NEUTROPHILS NFR BLD AUTO: 70 %
NRBC # BLD AUTO: 0 10E3/UL
NRBC BLD AUTO-RTO: 0 /100
PLATELET # BLD AUTO: 250 10E3/UL (ref 150–450)
POTASSIUM BLD-SCNC: 4.1 MMOL/L (ref 3.4–5.3)
PROCALCITONIN SERPL-MCNC: <0.05 NG/ML
PROT SERPL-MCNC: 7.7 G/DL (ref 6.8–8.8)
RBC # BLD AUTO: 5.12 10E6/UL (ref 4.4–5.9)
SODIUM SERPL-SCNC: 136 MMOL/L (ref 133–144)
TROPONIN I SERPL HS-MCNC: 42 NG/L
WBC # BLD AUTO: 18.8 10E3/UL (ref 4–11)

## 2022-01-10 PROCEDURE — 71275 CT ANGIOGRAPHY CHEST: CPT

## 2022-01-10 PROCEDURE — 84145 PROCALCITONIN (PCT): CPT | Performed by: EMERGENCY MEDICINE

## 2022-01-10 PROCEDURE — 80053 COMPREHEN METABOLIC PANEL: CPT | Performed by: EMERGENCY MEDICINE

## 2022-01-10 PROCEDURE — 93005 ELECTROCARDIOGRAM TRACING: CPT

## 2022-01-10 PROCEDURE — 36415 COLL VENOUS BLD VENIPUNCTURE: CPT | Performed by: EMERGENCY MEDICINE

## 2022-01-10 PROCEDURE — 99285 EMERGENCY DEPT VISIT HI MDM: CPT

## 2022-01-10 PROCEDURE — 250N000009 HC RX 250: Performed by: EMERGENCY MEDICINE

## 2022-01-10 PROCEDURE — 84484 ASSAY OF TROPONIN QUANT: CPT | Performed by: EMERGENCY MEDICINE

## 2022-01-10 PROCEDURE — 96375 TX/PRO/DX INJ NEW DRUG ADDON: CPT

## 2022-01-10 PROCEDURE — 250N000011 HC RX IP 250 OP 636: Performed by: EMERGENCY MEDICINE

## 2022-01-10 PROCEDURE — C9803 HOPD COVID-19 SPEC COLLECT: HCPCS

## 2022-01-10 PROCEDURE — 96365 THER/PROPH/DIAG IV INF INIT: CPT

## 2022-01-10 PROCEDURE — 85025 COMPLETE CBC W/AUTO DIFF WBC: CPT | Performed by: EMERGENCY MEDICINE

## 2022-01-10 PROCEDURE — 83605 ASSAY OF LACTIC ACID: CPT | Performed by: EMERGENCY MEDICINE

## 2022-01-10 PROCEDURE — 82040 ASSAY OF SERUM ALBUMIN: CPT | Performed by: EMERGENCY MEDICINE

## 2022-01-10 RX ORDER — HYDROMORPHONE HYDROCHLORIDE 1 MG/ML
0.5 INJECTION, SOLUTION INTRAMUSCULAR; INTRAVENOUS; SUBCUTANEOUS
Status: DISCONTINUED | OUTPATIENT
Start: 2022-01-10 | End: 2022-01-11

## 2022-01-10 RX ORDER — KETOROLAC TROMETHAMINE 15 MG/ML
15 INJECTION, SOLUTION INTRAMUSCULAR; INTRAVENOUS ONCE
Status: COMPLETED | OUTPATIENT
Start: 2022-01-10 | End: 2022-01-10

## 2022-01-10 RX ORDER — IOPAMIDOL 755 MG/ML
83 INJECTION, SOLUTION INTRAVASCULAR ONCE
Status: COMPLETED | OUTPATIENT
Start: 2022-01-10 | End: 2022-01-10

## 2022-01-10 RX ADMIN — HYDROMORPHONE HYDROCHLORIDE 0.5 MG: 1 INJECTION, SOLUTION INTRAMUSCULAR; INTRAVENOUS; SUBCUTANEOUS at 22:40

## 2022-01-10 RX ADMIN — SODIUM CHLORIDE 100 ML: 900 INJECTION INTRAVENOUS at 23:50

## 2022-01-10 RX ADMIN — IOPAMIDOL 160 ML: 755 INJECTION, SOLUTION INTRAVENOUS at 23:50

## 2022-01-10 RX ADMIN — KETOROLAC TROMETHAMINE 15 MG: 15 INJECTION, SOLUTION INTRAMUSCULAR; INTRAVENOUS at 22:41

## 2022-01-10 ASSESSMENT — ENCOUNTER SYMPTOMS
SHORTNESS OF BREATH: 1
COUGH: 1
BACK PAIN: 1
MYALGIAS: 1
FEVER: 0

## 2022-01-11 ENCOUNTER — APPOINTMENT (OUTPATIENT)
Dept: CARDIOLOGY | Facility: CLINIC | Age: 27
End: 2022-01-11
Attending: HOSPITALIST
Payer: COMMERCIAL

## 2022-01-11 ENCOUNTER — APPOINTMENT (OUTPATIENT)
Dept: ULTRASOUND IMAGING | Facility: CLINIC | Age: 27
End: 2022-01-11
Attending: EMERGENCY MEDICINE
Payer: COMMERCIAL

## 2022-01-11 PROBLEM — U07.1 PNEUMONIA DUE TO 2019 NOVEL CORONAVIRUS: Status: ACTIVE | Noted: 2021-12-31

## 2022-01-11 PROBLEM — J12.82 PNEUMONIA DUE TO 2019 NOVEL CORONAVIRUS: Status: ACTIVE | Noted: 2021-12-31

## 2022-01-11 LAB
ATRIAL RATE - MUSE: 91 BPM
BACTERIA SPT CULT: NORMAL
DIASTOLIC BLOOD PRESSURE - MUSE: NORMAL MMHG
FLUAV RNA SPEC QL NAA+PROBE: NEGATIVE
FLUBV RNA RESP QL NAA+PROBE: NEGATIVE
GLUCOSE BLDC GLUCOMTR-MCNC: 132 MG/DL (ref 70–99)
GLUCOSE BLDC GLUCOMTR-MCNC: 160 MG/DL (ref 70–99)
GLUCOSE BLDC GLUCOMTR-MCNC: 203 MG/DL (ref 70–99)
GRAM STAIN RESULT: NORMAL
INTERPRETATION ECG - MUSE: NORMAL
LVEF ECHO: NORMAL
P AXIS - MUSE: 4 DEGREES
PR INTERVAL - MUSE: 152 MS
QRS DURATION - MUSE: 88 MS
QT - MUSE: 344 MS
QTC - MUSE: 423 MS
R AXIS - MUSE: 21 DEGREES
SARS-COV-2 RNA RESP QL NAA+PROBE: POSITIVE
SYSTOLIC BLOOD PRESSURE - MUSE: NORMAL MMHG
T AXIS - MUSE: 3 DEGREES
VENTRICULAR RATE- MUSE: 91 BPM

## 2022-01-11 PROCEDURE — 93970 EXTREMITY STUDY: CPT

## 2022-01-11 PROCEDURE — 93321 DOPPLER ECHO F-UP/LMTD STD: CPT | Mod: 26 | Performed by: INTERNAL MEDICINE

## 2022-01-11 PROCEDURE — 99207 PR NO CHARGE LOS: CPT | Performed by: HOSPITALIST

## 2022-01-11 PROCEDURE — 93308 TTE F-UP OR LMTD: CPT | Mod: 26 | Performed by: INTERNAL MEDICINE

## 2022-01-11 PROCEDURE — 87070 CULTURE OTHR SPECIMN AEROBIC: CPT | Performed by: HOSPITALIST

## 2022-01-11 PROCEDURE — 250N000013 HC RX MED GY IP 250 OP 250 PS 637: Performed by: HOSPITALIST

## 2022-01-11 PROCEDURE — 94640 AIRWAY INHALATION TREATMENT: CPT | Mod: 76

## 2022-01-11 PROCEDURE — 120N000001 HC R&B MED SURG/OB

## 2022-01-11 PROCEDURE — 999N000157 HC STATISTIC RCP TIME EA 10 MIN

## 2022-01-11 PROCEDURE — 255N000002 HC RX 255 OP 636: Performed by: HOSPITALIST

## 2022-01-11 PROCEDURE — 250N000009 HC RX 250: Performed by: HOSPITALIST

## 2022-01-11 PROCEDURE — 250N000011 HC RX IP 250 OP 636: Performed by: HOSPITALIST

## 2022-01-11 PROCEDURE — 93325 DOPPLER ECHO COLOR FLOW MAPG: CPT | Mod: 26 | Performed by: INTERNAL MEDICINE

## 2022-01-11 PROCEDURE — 87636 SARSCOV2 & INF A&B AMP PRB: CPT | Performed by: HOSPITALIST

## 2022-01-11 PROCEDURE — 94640 AIRWAY INHALATION TREATMENT: CPT

## 2022-01-11 PROCEDURE — 250N000012 HC RX MED GY IP 250 OP 636 PS 637: Performed by: HOSPITALIST

## 2022-01-11 PROCEDURE — 250N000011 HC RX IP 250 OP 636: Performed by: EMERGENCY MEDICINE

## 2022-01-11 PROCEDURE — 99223 1ST HOSP IP/OBS HIGH 75: CPT | Mod: AI | Performed by: HOSPITALIST

## 2022-01-11 RX ORDER — METHYLPREDNISOLONE SODIUM SUCCINATE 125 MG/2ML
60 INJECTION, POWDER, LYOPHILIZED, FOR SOLUTION INTRAMUSCULAR; INTRAVENOUS EVERY 12 HOURS
Status: DISCONTINUED | OUTPATIENT
Start: 2022-01-12 | End: 2022-01-12

## 2022-01-11 RX ORDER — ACETAMINOPHEN 325 MG/1
975 TABLET ORAL
Status: DISCONTINUED | OUTPATIENT
Start: 2022-01-11 | End: 2022-01-13 | Stop reason: HOSPADM

## 2022-01-11 RX ORDER — NALOXONE HYDROCHLORIDE 0.4 MG/ML
0.4 INJECTION, SOLUTION INTRAMUSCULAR; INTRAVENOUS; SUBCUTANEOUS
Status: DISCONTINUED | OUTPATIENT
Start: 2022-01-11 | End: 2022-01-13 | Stop reason: HOSPADM

## 2022-01-11 RX ORDER — POLYETHYLENE GLYCOL 3350 17 G/17G
17 POWDER, FOR SOLUTION ORAL DAILY PRN
Status: DISCONTINUED | OUTPATIENT
Start: 2022-01-11 | End: 2022-01-13 | Stop reason: HOSPADM

## 2022-01-11 RX ORDER — CEFTRIAXONE 2 G/1
2 INJECTION, POWDER, FOR SOLUTION INTRAMUSCULAR; INTRAVENOUS ONCE
Status: DISCONTINUED | OUTPATIENT
Start: 2022-01-11 | End: 2022-01-11

## 2022-01-11 RX ORDER — LIDOCAINE 40 MG/G
CREAM TOPICAL
Status: DISCONTINUED | OUTPATIENT
Start: 2022-01-11 | End: 2022-01-13 | Stop reason: HOSPADM

## 2022-01-11 RX ORDER — ONDANSETRON 2 MG/ML
4 INJECTION INTRAMUSCULAR; INTRAVENOUS EVERY 6 HOURS PRN
Status: DISCONTINUED | OUTPATIENT
Start: 2022-01-11 | End: 2022-01-13 | Stop reason: HOSPADM

## 2022-01-11 RX ORDER — GUAIFENESIN/DEXTROMETHORPHAN 100-10MG/5
10 SYRUP ORAL EVERY 4 HOURS PRN
Status: DISCONTINUED | OUTPATIENT
Start: 2022-01-11 | End: 2022-01-13 | Stop reason: HOSPADM

## 2022-01-11 RX ORDER — CEFTRIAXONE 2 G/1
2 INJECTION, POWDER, FOR SOLUTION INTRAMUSCULAR; INTRAVENOUS EVERY 24 HOURS
Status: COMPLETED | OUTPATIENT
Start: 2022-01-11 | End: 2022-01-12

## 2022-01-11 RX ORDER — LORAZEPAM 0.5 MG/1
1 TABLET ORAL ONCE
Status: COMPLETED | OUTPATIENT
Start: 2022-01-11 | End: 2022-01-11

## 2022-01-11 RX ORDER — AZITHROMYCIN 500 MG/1
500 INJECTION, POWDER, LYOPHILIZED, FOR SOLUTION INTRAVENOUS ONCE
Status: COMPLETED | OUTPATIENT
Start: 2022-01-11 | End: 2022-01-11

## 2022-01-11 RX ORDER — METHOCARBAMOL 500 MG/1
500 TABLET, FILM COATED ORAL 3 TIMES DAILY PRN
Status: DISCONTINUED | OUTPATIENT
Start: 2022-01-11 | End: 2022-01-13 | Stop reason: HOSPADM

## 2022-01-11 RX ORDER — NICOTINE POLACRILEX 4 MG
15-30 LOZENGE BUCCAL
Status: DISCONTINUED | OUTPATIENT
Start: 2022-01-11 | End: 2022-01-13 | Stop reason: HOSPADM

## 2022-01-11 RX ORDER — IPRATROPIUM BROMIDE AND ALBUTEROL SULFATE 2.5; .5 MG/3ML; MG/3ML
3 SOLUTION RESPIRATORY (INHALATION) ONCE
Status: COMPLETED | OUTPATIENT
Start: 2022-01-11 | End: 2022-01-11

## 2022-01-11 RX ORDER — NALOXONE HYDROCHLORIDE 0.4 MG/ML
0.2 INJECTION, SOLUTION INTRAMUSCULAR; INTRAVENOUS; SUBCUTANEOUS
Status: DISCONTINUED | OUTPATIENT
Start: 2022-01-11 | End: 2022-01-13 | Stop reason: HOSPADM

## 2022-01-11 RX ORDER — METHYLPREDNISOLONE SODIUM SUCCINATE 125 MG/2ML
125 INJECTION, POWDER, LYOPHILIZED, FOR SOLUTION INTRAMUSCULAR; INTRAVENOUS EVERY 12 HOURS
Status: COMPLETED | OUTPATIENT
Start: 2022-01-11 | End: 2022-01-11

## 2022-01-11 RX ORDER — AZITHROMYCIN 500 MG/1
500 INJECTION, POWDER, LYOPHILIZED, FOR SOLUTION INTRAVENOUS EVERY 24 HOURS
Status: DISCONTINUED | OUTPATIENT
Start: 2022-01-11 | End: 2022-01-11

## 2022-01-11 RX ORDER — OXYCODONE HYDROCHLORIDE 5 MG/1
5 TABLET ORAL EVERY 4 HOURS PRN
Status: DISCONTINUED | OUTPATIENT
Start: 2022-01-11 | End: 2022-01-13 | Stop reason: HOSPADM

## 2022-01-11 RX ORDER — ACETAMINOPHEN 325 MG/1
325-650 TABLET ORAL EVERY 6 HOURS PRN
Status: ON HOLD | COMMUNITY
End: 2022-01-13

## 2022-01-11 RX ORDER — CEFTRIAXONE 2 G/1
2 INJECTION, POWDER, FOR SOLUTION INTRAMUSCULAR; INTRAVENOUS ONCE
Status: COMPLETED | OUTPATIENT
Start: 2022-01-11 | End: 2022-01-11

## 2022-01-11 RX ORDER — IPRATROPIUM BROMIDE AND ALBUTEROL SULFATE 2.5; .5 MG/3ML; MG/3ML
3 SOLUTION RESPIRATORY (INHALATION)
Status: DISCONTINUED | OUTPATIENT
Start: 2022-01-11 | End: 2022-01-13 | Stop reason: HOSPADM

## 2022-01-11 RX ORDER — DEXTROSE MONOHYDRATE 25 G/50ML
25-50 INJECTION, SOLUTION INTRAVENOUS
Status: DISCONTINUED | OUTPATIENT
Start: 2022-01-11 | End: 2022-01-13 | Stop reason: HOSPADM

## 2022-01-11 RX ORDER — DEXAMETHASONE SODIUM PHOSPHATE 10 MG/ML
6 INJECTION, SOLUTION INTRAMUSCULAR; INTRAVENOUS ONCE
Status: COMPLETED | OUTPATIENT
Start: 2022-01-11 | End: 2022-01-11

## 2022-01-11 RX ORDER — ONDANSETRON 4 MG/1
4 TABLET, ORALLY DISINTEGRATING ORAL EVERY 6 HOURS PRN
Status: DISCONTINUED | OUTPATIENT
Start: 2022-01-11 | End: 2022-01-13 | Stop reason: HOSPADM

## 2022-01-11 RX ORDER — ALBUTEROL SULFATE 0.83 MG/ML
3 SOLUTION RESPIRATORY (INHALATION)
Status: DISCONTINUED | OUTPATIENT
Start: 2022-01-11 | End: 2022-01-13 | Stop reason: HOSPADM

## 2022-01-11 RX ADMIN — LORAZEPAM 1 MG: 0.5 TABLET ORAL at 04:04

## 2022-01-11 RX ADMIN — OXYCODONE HYDROCHLORIDE 5 MG: 5 TABLET ORAL at 14:06

## 2022-01-11 RX ADMIN — IPRATROPIUM BROMIDE AND ALBUTEROL SULFATE 3 ML: .5; 3 SOLUTION RESPIRATORY (INHALATION) at 23:30

## 2022-01-11 RX ADMIN — DEXAMETHASONE SODIUM PHOSPHATE 6 MG: 10 INJECTION, SOLUTION INTRAMUSCULAR; INTRAVENOUS at 02:44

## 2022-01-11 RX ADMIN — CEFTRIAXONE SODIUM 2 G: 2 INJECTION, POWDER, FOR SOLUTION INTRAMUSCULAR; INTRAVENOUS at 22:09

## 2022-01-11 RX ADMIN — IPRATROPIUM BROMIDE AND ALBUTEROL SULFATE 3 ML: .5; 3 SOLUTION RESPIRATORY (INHALATION) at 04:08

## 2022-01-11 RX ADMIN — METHOCARBAMOL 500 MG: 500 TABLET ORAL at 14:06

## 2022-01-11 RX ADMIN — METHYLPREDNISOLONE SODIUM SUCCINATE 125 MG: 125 INJECTION, POWDER, FOR SOLUTION INTRAMUSCULAR; INTRAVENOUS at 22:12

## 2022-01-11 RX ADMIN — AZITHROMYCIN MONOHYDRATE 500 MG: 500 INJECTION, POWDER, LYOPHILIZED, FOR SOLUTION INTRAVENOUS at 04:06

## 2022-01-11 RX ADMIN — ACETAMINOPHEN 975 MG: 325 TABLET, FILM COATED ORAL at 11:36

## 2022-01-11 RX ADMIN — IPRATROPIUM BROMIDE AND ALBUTEROL SULFATE 3 ML: .5; 3 SOLUTION RESPIRATORY (INHALATION) at 11:44

## 2022-01-11 RX ADMIN — ACETAMINOPHEN 975 MG: 325 TABLET, FILM COATED ORAL at 16:37

## 2022-01-11 RX ADMIN — HUMAN ALBUMIN MICROSPHERES AND PERFLUTREN 3 ML: 10; .22 INJECTION, SOLUTION INTRAVENOUS at 12:30

## 2022-01-11 RX ADMIN — GUAIFENESIN AND DEXTROMETHORPHAN 10 ML: 100; 10 SYRUP ORAL at 11:42

## 2022-01-11 RX ADMIN — METHYLPREDNISOLONE SODIUM SUCCINATE 125 MG: 125 INJECTION, POWDER, FOR SOLUTION INTRAMUSCULAR; INTRAVENOUS at 11:36

## 2022-01-11 RX ADMIN — INSULIN ASPART 1 UNITS: 100 INJECTION, SOLUTION INTRAVENOUS; SUBCUTANEOUS at 18:12

## 2022-01-11 RX ADMIN — ACETAMINOPHEN 975 MG: 325 TABLET, FILM COATED ORAL at 23:47

## 2022-01-11 RX ADMIN — CEFTRIAXONE SODIUM 2 G: 2 INJECTION, POWDER, FOR SOLUTION INTRAMUSCULAR; INTRAVENOUS at 02:44

## 2022-01-11 RX ADMIN — ENOXAPARIN SODIUM 40 MG: 40 INJECTION SUBCUTANEOUS at 22:13

## 2022-01-11 RX ADMIN — ENOXAPARIN SODIUM 40 MG: 40 INJECTION SUBCUTANEOUS at 11:42

## 2022-01-11 ASSESSMENT — ACTIVITIES OF DAILY LIVING (ADL)
ADLS_ACUITY_SCORE: 3

## 2022-01-11 NOTE — TELEPHONE ENCOUNTER
Triage Call: Severe shortness of breath, cannot breath normal, getting sharp 7-8/10 pain in back, shoulder and chest. Weak.     According to the protocol, patient should call 911. Care advice given. Patient verbalizes understanding and agrees with plan of care, patient is going to have his roommate call 911.     FNA called back to make sure patient was able to get a hold of 911. Patient stated he took his inhaler and tylenol and hooked up to oxygen, patient stated he is not going to call 911 or go into the ED and see how it goes. Patient again was advised to call 911. He stated he will think about it and if he feels like he should he will call 911.     COVID 19 Nurse Triage Plan/Patient Instructions    Please be aware that novel coronavirus (COVID-19) may be circulating in the community. If you develop symptoms such as fever, cough, or SOB or if you have concerns about the presence of another infection including coronavirus (COVID-19), please contact your health care provider or visit [email or web address].     Disposition/Instructions    Call to EMS/911 recommended. Follow protocol based instructions.     Bring Your Own Device:  Please also bring your smart device(s) (smart phones, tablets, laptops) and their charging cables for your personal use and to communicate with your care team during your visit.    Thank you for taking steps to prevent the spread of this virus.  o Limit your contact with others.  o Wear a simple mask to cover your cough.  o Wash your hands well and often.    Resources    [facility]: About COVID-19: [email or web address]    CDC: What to Do If You're Sick: www.cdc.gov/coronavirus/2019-ncov/about/steps-when-sick.html    CDC: Ending Home Isolation: www.cdc.gov/coronavirus/2019-ncov/hcp/disposition-in-home-patients.html     CDC: Caring for Someone: www.cdc.gov/coronavirus/2019-ncov/if-you-are-sick/care-for-someone.html     [facility]: Interim Guidance for Hospital Discharge to  Home: www.health.[facility].[place]/diseases/coronavirus/hcp/hospdischarge.pdf    [facility] clinical trials (COVID-19 research studies): [email or web address].[email or web address]/n-clinical-trials     Below are the COVID-19 hotlines at the [place] Department of Health ([facility]). Interpreters are available.   o For health questions: Call 773-505-7740 or [phone] (7 a.m. to 7 p.m.)  o For questions about schools and childcare: Call 507-072-1541 or [phone] (7 a.m. to 7 p.m.)     Gabbie Godinez RN Nursing Advisor 1/10/2022 6:56 PM     Reason for Disposition    SEVERE difficulty breathing (e.g., struggling for each breath, speaks in single words)    Protocols used: CORONAVIRUS (COVID-19) DIAGNOSED OR [id number] 8.25.2021

## 2022-01-11 NOTE — TELEPHONE ENCOUNTER
Caller is post Covid; Ill and hospitalized from 12/ 31 to 01/06   Caller states that  today he developed back pain suddenly while driving. Pain is in through  upper body  and worse with deep breath, constant and unreleived with tylenol   Caller spokw with previous  FNA and was advised to return to ED several hours ago   caller states he did notfollow protocol because he was anxiousatthe time but is calmer now   Callerreprotsthat his O2 sat is  93 % on  1 l pnc that he has  strted  Again and needs tosit up right to breathe and control pain.   Triage protocl and EMR revieiweid    Caller  Is advised to return to ED for  New onset of chest/back pain post Covid   Caller understands manny Cain RN  FNA                 Reason for Disposition    Recent illness requiring prolonged bedrest (i.e., immobilization)    SEVERE or constant chest pain or pressure (Exception: mild central chest pain, present only when coughing)    Additional Information    Negative: Severe difficulty breathing (e.g., struggling for each breath, speaks in single words)    Negative: Difficult to awaken or acting confused (e.g., disoriented, slurred speech)    Negative: Shock suspected (e.g., cold/pale/clammy skin, too weak to stand, low BP, rapid pulse)    Negative: [1] Chest pain lasts > 5 minutes AND [2] history of heart disease  (i.e., heart attack, bypass surgery, angina, angioplasty, CHF; not just a heart murmur)    Negative: [1] Chest pain lasts > 5 minutes AND [2] described as crushing, pressure-like, or heavy    Negative: [1] Chest pain lasts > 5 minutes AND [2] age > 50    Negative: [1] Chest pain lasts > 5 minutes AND [2] age > 30 AND [3] at least one cardiac risk factor (i.e., hypertension, diabetes, obesity, smoker or strong family history of heart disease)    Negative: [1] Chest pain lasts > 5 minutes AND [2] not relieved with nitroglycerin    Negative: Passed out (i.e., lost consciousness, collapsed and was not  responding)    Negative: Heart beating < 50 beats per minute OR > 140 beats per minute    Negative: Visible sweat on face or sweat dripping down face    Negative: Sounds like a life-threatening emergency to the triager    Negative: SEVERE difficulty breathing (e.g., struggling for each breath, speaks in single words)    Negative: Difficult to awaken or acting confused (e.g., disoriented, slurred speech)    Negative: Bluish (or gray) lips or face now    Negative: Shock suspected (e.g., cold/pale/clammy skin, too weak to stand, low BP, rapid pulse)    Negative: Sounds like a life-threatening emergency to the triager    Protocols used: CHEST PAIN-A-AH, CORONAVIRUS (COVID-19) DIAGNOSED OR [id number] 8.25.2021  COVID 19 Nurse Triage Plan/Patient Instructions    Please be aware that novel coronavirus (COVID-19) may be circulating in the community. If you develop symptoms such as fever, cough, or SOB or if you have concerns about the presence of another infection including coronavirus (COVID-19), please contact your health care provider or visit [email or web address].     Disposition/Instructions    ED Visit recommended. Follow protocol based instructions.     Bring Your Own Device:  Please also bring your smart device(s) (smart phones, tablets, laptops) and their charging cables for your personal use and to communicate with your care team during your visit.    Thank you for taking steps to prevent the spread of this virus.  o Limit your contact with others.  o Wear a simple mask to cover your cough.  o Wash your hands well and often.    Resources    [facility]: About COVID-19: [email or web address]    CDC: What to Do If You're Sick: www.cdc.gov/coronavirus/2019-ncov/about/steps-when-sick.html    CDC: Ending Home Isolation: www.cdc.gov/coronavirus/2019-ncov/hcp/disposition-in-home-patients.html     CDC: Caring for Someone: www.cdc.gov/coronavirus/2019-ncov/if-you-are-sick/care-for-someone.html      [facility]: Interim Guidance for Hospital Discharge to Home: www.health.[facility].[place]/diseases/coronavirus/hcp/hospdischarge.pdf    [facility] clinical trials (COVID-19 research studies): [email or web address].[facility]/n-clinical-trials     Below are the COVID-19 hotlines at the [place] Department of Health ([facility]). Interpreters are available.   o For health questions: Call 007-022-4999 or 1-740.936.4245 (7 a.m. to 7 p.m.)  o For questions about schools and childcare: Call 649-218-7174 or 1-695.277.7309 (7 a.m. to 7 p.m.)

## 2022-01-11 NOTE — PROGRESS NOTES
[facility]    Hospitalist Progress Note      Assessment & Plan   Adin Meade is a 27 year old male who was admitted on 1/10/2022 with past medical history that is most notable for recently diagnosed and treated pneumonia due to COVID-19, who presents with recurrent dyspnea and is found to have suspected acute hypoxemic respiratory distress due to worsening bilateral COVID-19 pneumonia.    Suspected acute hypoxemic respiratory distress due to worsening bilateral COVID-19 pneumonia: Of note, he is reportedly unvaccinated for COVID; his COVID symptoms started around 12/24/2021. He was admitted 12/31/2021 for bilateral pneumonia on CTPA, and also tested positive for COVID on that date. He required oxygen while hospitalized and was treated with steroids and Remdesivir. He was also treated for JOVANNI and Type 2 NSTEMI; TTE showed preserved LVEF. Eventually he was stabilized and discharged 1/4/2022 to continue 1 L O2 with exertion. Now he returns one week later with worsening dyspnea in the past 1-2 days. He is wheezing in the ED and now requires 3 L O2 to keep sats above 90%. CTPA tonight was limited in evaluation for PE; bilateral ground glass opacifications previously seen seem to have worsened. We suspect acute reactive airways disease exacerbation due to COVID, possibly ongoing or worsening COVID pneumonia. He has concomitant leukocytosis which could be related to recent steroid use causing stress demargination vs acute bacterial infection. We will treat for now for possible post-viral CAP pneumonia. procal <0.05. FLU negative  * echo 1/11: EF 60-65% with mild concentric LVH. RV normal.  - continue precautions  - monitor O2, titrate to 90-96%   - encourage IS   - ceftriaxone/azithromycin added on admission, continue for now, but low threshold to discontinue given low procal if significant improvement in next 24 hours  - 125mg solumedrol q12h x2 doses then wean to 60mg q12h starting 1/12  AM  - duonebs q4h while awake, albuterol nebs available  - lovenox 40mg q12h, hold PTA xarelto  - ISS insulin ordered while IV steroids are given  - trend covid inflammatory markers     Severe morbid obesity: Body mass index is 53.28 kg/m .     -- He is at increased risk of morbidity and mortality from COVID pneumonia due to severe obesity    -- Close outpatient follow up will be needed at discharge    Clinically Significant Risk Factors Present on Admission             # Coagulation Defect: home medication list includes an anticoagulant medication        DVT Prophylaxis: Enoxaparin (Lovenox) SQ  Code Status: Full Code  Expected Discharge: 01/13/2022     Anticipated discharge location:  Awaiting care coordination huddle  Delays:     pending respiratory improvement      Kimberly An DO  Hospitalist Service  [facility]  Securely message with the Vocera Web Console (learn more here)  Text Page (7am - 6pm) via [facility] Paging/Directory      Interval History   Patient seen and examined. He feels better with the nebulizers, breathing is a bit easier. Still feels poorly. No fevers since admission. Discussed plan of care with ongoing nebs and steroids, will wean off as he clinically improves. He did have a little blood in sputum after coughing, will continue to monitor    -Data reviewed today: I reviewed all new labs and imaging results over the last 24 hours. I personally reviewed no images or EKG's today.    Physical Exam   Temp: 98.6  F (37  C) Temp src: Oral BP: (!) 146/96 Pulse: 106   Resp: 18 SpO2: 92 % O2 Device: None (Room air) Oxygen Delivery: 3 LPM  Vitals:    01/10/22 2150   Weight: (!) 188.2 kg (415 lb)     Vital Signs with Ranges  Temp:  [97.1  F (36.2  C)-98.6  F (37  C)] 98.6  F (37  C)  Pulse:  [] 106  Resp:  [16-22] 18  BP: (110-149)/(60-96) 146/96  SpO2:  [92 %-98 %] 92 %  No intake/output data recorded.    Constitutional: Awake, alert, cooperative, no apparent  distress  Respiratory: Clear to auscultation bilaterally, no crackles or wheezing on auscultation  Cardiovascular: Regular rate and rhythm, normal S1 and S2, and no murmur noted  GI: Normal bowel sounds, soft, non-distended, non-tender, obese  Skin/Integumen: No rashes, no cyanosis, trace lower extremity edema  Other:     Medications       acetaminophen  975 mg Oral Q8H IS     [START ON 1/12/2022] azithromycin  250 mg Intravenous Q24H     cefTRIAXone  2 g Intravenous Q24H     enoxaparin ANTICOAGULANT  40 mg Subcutaneous Q12H     insulin aspart  1-7 Units Subcutaneous TID AC     insulin aspart  1-5 Units Subcutaneous At Bedtime     ipratropium - albuterol 0.5 mg/2.5 mg/3 mL  3 mL Nebulization Q4H While awake     methylPREDNISolone  125 mg Intravenous Q12H     sodium chloride (PF)  3 mL Intracatheter Q8H       Data   Recent Labs   Lab 01/11/22  1626 01/11/22  1344 01/10/22  2158   WBC  --   --  18.8*   HGB  --   --  14.3   MCV  --   --  85   PLT  --   --  250   NA  --   --  136   POTASSIUM  --   --  4.1   CHLORIDE  --   --  105   CO2  --   --  22   BUN  --   --  15   CR  --   --  0.80   ANIONGAP  --   --  9   SINCERE  --   --  8.8   * 132* 105*   ALBUMIN  --   --  3.1*   PROTTOTAL  --   --  7.7   BILITOTAL  --   --  0.4   ALKPHOS  --   --  45   ALT  --   --  62   AST  --   --  27       Recent Results (from the past 24 hour(s))   CT Chest Pulmonary Embolism w Contrast    Addendum: 1/11/2022    This study was addended to reflect availability of comparison CT chest dated 12/31/2021.    EXAM: CT CHEST PULMONARY EMBOLISM W CONTRAST  LOCATION: [facility]  DATE/TIME: 1/10/2022 11:54 PM    INDICATION: CP, SOB, COVID, PE  COMPARISON: 12/31/2021  TECHNIQUE: CT chest pulmonary angiogram during arterial phase injection of IV contrast. Multiplanar reformats and MIP reconstructions were performed. Dose reduction techniques were used.   CONTRAST: 160mL Isovue 370.    FINDINGS:  ANGIOGRAM CHEST: Poor  opacification of the main pulmonary arteries despite 2 attempts with mean Hounsfield units on second attempt of 177 Hounsfield units. No large central pulmonary embolus, though pulmonary embolus cannot be excluded based on this   study. Thoracic aorta is negative for dissection. No CT evidence of right heart strain.    LUNGS AND PLEURA: Worsening, diffuse, multifocal groundglass nodular opacities consistent with COVID pneumonia in the proper clinical context, recommend follow-up to resolution. No pneumothorax or pleural effusion.    MEDIASTINUM/AXILLAE: Normal.    CORONARY ARTERY CALCIFICATION: None.    UPPER ABDOMEN: Normal.    MUSCULOSKELETAL: Normal.    IMPRESSION:  1.  Poor contrast opacification of the pulmonary artery. No central pulmonary embolus, though pulmonary embolus cannot be excluded based on this study. If clinical concern persists, consider repeat CT PE protocol.  2.  CT findings consistent with worsening COVID pneumonia/ARDS, recommend follow-up to resolution and exclude superimposed bacterial infection..    Commonly reported imaging features of (COVID-19) pneumonia are present. Influenza pneumonia and organizing pneumonia as can be seen in the setting of drug toxicity and connective tissue disease can cause a similar imaging pattern.        Narrative    EXAM: CT CHEST PULMONARY EMBOLISM W CONTRAST  LOCATION: [facility]  DATE/TIME: 1/10/2022 11:54 PM    INDICATION: CP, SOB, COVID, PE  COMPARISON: None.  TECHNIQUE: CT chest pulmonary angiogram during arterial phase injection of IV contrast. Multiplanar reformats and MIP reconstructions were performed. Dose reduction techniques were used.   CONTRAST: 160mL Isovue 370.    FINDINGS:  ANGIOGRAM CHEST: Poor opacification of the main pulmonary arteries despite 2 attempts with mean Hounsfield units on second attempt of 177 Hounsfield units. No large central pulmonary embolus, though pulmonary embolus cannot be excluded based on  this   study. Thoracic aorta is negative for dissection. No CT evidence of right heart strain.    LUNGS AND PLEURA: Diffuse, multifocal groundglass nodular opacities consistent with COVID pneumonia in the proper clinical context, recommend follow-up to resolution. No pneumothorax or pleural effusion.    MEDIASTINUM/AXILLAE: Normal.    CORONARY ARTERY CALCIFICATION: None.    UPPER ABDOMEN: Normal.    MUSCULOSKELETAL: Normal.      Impression    IMPRESSION:  1.  Poor contrast opacification of the pulmonary artery. No central pulmonary embolus, though pulmonary embolus cannot be excluded based on this study. If clinical concern persists, consider repeat CT PE protocol.  2.  CT findings consistent with COVID pneumonia, recommend follow-up to resolution.    Commonly reported imaging features of (COVID-19) pneumonia are present. Influenza pneumonia and organizing pneumonia as can be seen in the setting of drug toxicity and connective tissue disease can cause a similar imaging pattern.   US Lower Extremity Venous Duplex Bilateral    Narrative    EXAM: US LOWER EXTREMITY VENOUS DUPLEX BILATERAL  LOCATION: [facility]  DATE/TIME: 1/11/2022 12:01 AM    INDICATION: pain, dvt. COVID.  COMPARISON: None.  TECHNIQUE: Venous Duplex ultrasound of bilateral lower extremities with and without compression, augmentation and duplex. Color flow and spectral Doppler with waveform analysis performed.    FINDINGS: Exam includes the common femoral, femoral, popliteal veins as well as segmentally visualized deep calf veins and greater saphenous vein.     RIGHT: No deep vein thrombosis. No superficial thrombophlebitis. No popliteal cyst.    LEFT: No deep vein thrombosis. No superficial thrombophlebitis. No popliteal cyst.      Impression    IMPRESSION:  1.  No deep venous thrombosis in the bilateral lower extremities.   Echo Limited   Result Value    LVEF  60-65%    Narrative     [id number]  [id number]  [id number]  [id number]^JAIME^ANTONIO^RAMESH     [facility]  [facility]  Echocardiography Laboratory  [address]  [place] [address]  Phone [phone]  Fax [phone]     Name: MADHAVI ROBERSON  MRN: [id number]  : 1995  Study Date: 2022 11:52 AM  Age: 27 yrs  Gender: Male  Patient Location: [facility]  Reason For Study: CHF  Ordering Physician: ANTONIO SANDOVAL  Referring Physician: ANTONIO SANDOVAL  Performed By: MARY JANE Gaona     BSA: 2.9 m2  Height: 73 in  Weight: 415 lb  HR: 97  BP: 138/78 mmHg  ______________________________________________________________________________  Procedure  Limited Portable Echo Adult. Optison (NDC #[id number]) given intravenously.     ______________________________________________________________________________  Interpretation Summary     The visual ejection fraction is 60-65%.  There is mild concentric left ventricular hypertrophy.  The right ventricle is normal in size and function.  No significant valve disease.     Technically difficult study. Limited echo.  ______________________________________________________________________________  Left Ventricle  The left ventricle is normal in size. There is mild concentric left  ventricular hypertrophy. Left ventricular systolic function is normal. The  visual ejection fraction is 60-65%. Diastolic Doppler findings (E/E' ratio  and/or other parameters) suggest left ventricular filling pressures are  indeterminate.     Right Ventricle  The right ventricle is not well visualized. The right ventricle is normal in  size and function.     Atria  The left atrium is not well visualized. Normal left atrial size. Right atrial  size is normal. Right atrium not well visualized.     Mitral Valve  The mitral valve is normal in structure and function. There is trace mitral  regurgitation.     Tricuspid Valve  The tricuspid valve is normal in  structure and function.     Aortic Valve  The aortic valve is not well visualized. No aortic regurgitation is present.  No hemodynamically significant valvular aortic stenosis.     Pulmonic Valve  The pulmonic valve is not well visualized.     Vessels  The aortic root is normal size. The aortic root is not well visualized. Normal  size ascending aorta. Dilation of the inferior vena cava is present with  normal respiratory variation in diameter.     Pericardium  There is no pericardial effusion.     ______________________________________________________________________________  MMode/2D Measurements & Calculations  IVSd: 1.2 cm  LVIDd: 5.1 cm  LVIDs: 2.8 cm  LVPWd: 1.3 cm  FS: 44.8 %  LV mass(C)d: 265.8 grams  LV mass(C)dI: 90.6 grams/m2  Ao root diam: 3.1 cm  asc Aorta Diam: 3.1 cm  RWT: 0.52     Doppler Measurements & Calculations  MV E max chavo: 97.0 cm/sec  MV A max chavo: 95.6 cm/sec  MV E/A: 1.0  MV dec time: 0.17 sec  PA acc time: 0.15 sec  E/E' av.9  Lateral E/e': 10.7  Medial E/e': 7.1     ______________________________________________________________________________  Report approved by: Jerome Schuler 2022 04:43 PM

## 2022-01-11 NOTE — ED PROVIDER NOTES
History   Chief Complaint:  Chest and back pain along with shortness of breath    The history is provided by the patient.      Adin Meade is a 27 year old male with a history of morbid obesity who presents with worsening shortness of breath. The patient was recently admitted to the hospital from 12/31/21-1/3/22 for COVID pneumonia. He was discharged with Tessalon, Decadron, Xarelto, and placed on home oxygen, and told to follow up with primary care in 2-4 weeks. Since his discharge, he has been taking his medications but has not been consistent with his prescribed antibiotics, has apparently he ran out after 2 days. Additionally, he has had ongoing bilateral calf pain since his discharge. He notes that he has still been feeling poorly, but today when he went to visit his daughter, he developed diffuse back and chest pain as well as worsening shortness of breath. The patient still has a cough and also notes that it is painful to breathe. He denies any recent fever. He is not COVID vaccinated.     Review of Systems   Constitutional: Negative for fever.   Respiratory: Positive for cough and shortness of breath.    Cardiovascular: Positive for chest pain.   Musculoskeletal: Positive for back pain and myalgias (bilateral calf pain).   All other systems reviewed and are negative.      Allergies:  The patient has no known allergies.     Medications:  Tessalon  Decadron  Vibra-tabs  Xarelto     Past Medical History:     Morbid obesity   Demand ischemia  COVID 19 infection   COVID pneumonia       Past Surgical History:    The patient has no pertinent surgical history.     Family History:    The patient has no known family history.    Social History:  The patient presents to the ED alone. He has a daughter.       Physical Exam     Patient Vitals for the past 24 hrs:   BP Temp Temp src Pulse Resp SpO2 Weight   01/10/22 2152 -- 97.1  F (36.2  C) Temporal -- -- -- --   01/10/22 2150 139/67 -- -- 95 22 98 % (!) 188.2 kg  (415 lb)       Physical Exam  Nursing note and vitals reviewed.  Constitutional:  Oriented to person, place, and time. Cooperative.  Appears uncomfortable and on oxygen by nasal cannula.  HENT:   Nose:    Nose normal.   Mouth/Throat:   Facemask in place.   Eyes:    Conjunctivae normal and EOM are normal.      Pupils are equal, round, and reactive to light.   Neck:    Trachea normal.   Cardiovascular:  Normal rate, regular rhythm, normal heart sounds and normal pulses. No murmur heard.  Pulmonary/Chest:  Somewhat tachypneic with diffuse coarse breath sounds present.   Abdominal:   Soft. Normal appearance and bowel sounds are normal.      There is no tenderness.      There is no rebound and no CVA tenderness.   Musculoskeletal:  Bilateral lower extremities are normal in appearance but some tenderness to palpation to both calves.  Extremities atraumatic x 4.   Lymphadenopathy:  No cervical adenopathy.   Neurological:   Alert and oriented to person, place, and time. Normal strength.      No cranial nerve deficit or sensory deficit. GCS eye subscore is 4. GCS verbal subscore is 5. GCS motor subscore is 6.   Skin:    Skin is intact. No rash noted.   Psychiatric:   Normal mood and affect.      Emergency Department Course   ECG  ECG obtained at 2243, ECG read at 2300  Normal sinus rhythm. Normal ECG.    No significant change as compared to prior, dated 12/31/2021.  Rate 91 bpm. WA interval 152 ms. QRS duration 88 ms. QT/QTc 344/423 ms. P-R-T axes 4 21 3.     Imaging:  CT Chest Pulmonary Embolism w Contrast  1.  Poor contrast opacification of the pulmonary artery. No central pulmonary embolus, though pulmonary embolus cannot be excluded based on this study. If clinical concern persists, consider repeat CT PE protocol.  2.  CT findings consistent with worsening COVID pneumonia/ARDS, recommend follow-up to resolution and exclude superimposed bacterial infection..    US Lower Extremity Venous Duplex Bilateral  No deep venous  thrombosis in the bilateral lower extremities.    Reports per radiology    Laboratory:  CBC: WBC 18.8 (H), HGB 14.3,    CMP: Glucose 105 (H) o/w WNL (Creatinine 0.80)     Troponin (Collected 2158): 42  Lactic acid (result time 2258) 1.0     Procalcitonin: <0.05      Emergency Department Course:  Reviewed:  I reviewed nursing notes, vitals, past medical history, Care Everywhere and [facility]    Assessments:  2220 I obtained history and examined the patient as noted above.   0045 I rechecked the patient and explained findings.   0158 I spoke with the patient to discuss his treatment plan.     Consults:  0221 I spoke with Dr. Acosta, hospitalist, who agreed to accept the patient.     Interventions:  2240 Dilaudid 0.5 mg IV  2241 Toradol 15 mg IV    Disposition:  The patient was admitted to the hospital under the care of Dr. Acosta.     Impression & Plan     Medical Decision Making:  This is a 27-year-old male who was recently admitted for COVID-pneumonia who came in tonight for worsening symptoms.  While he is not hypoxic on oxygen, he is tachypneic and has coarse breath sounds throughout.  I was concerned that he might have a pulmonary embolism at this point or worsening COVID-pneumonia or possibly bacterial pneumonia as well.  I proceeded with the above work-up including the blood work, CT scan of the chest, and ultrasound of the bilateral lower extremities.  He was also provided some pain medication.  He does not have a DVT or a PE, however he does have significantly worsening COVID-pneumonia on the CT scan.  He also has a significant elevation of his WBC.  Therefore I am providing him IV antibiotics, and I feel that it is best that he be readmitted to the hospital.  We are going to also give him more dexamethasone.  I subsequently spoke with Dr. Acosta, who will be admitting him.    Diagnosis:    ICD-10-CM    1. Pneumonia due to 2019 novel coronavirus  U07.1     J12.82          Scribe Disclosure:  Ernestina STEPHENSON  Rangel, am serving as a scribe at 10:09 PM on 1/10/2022 to document services personally performed by Yfn Yeung MD based on my observations and the provider's statements to me.              Yfn Yeung MD  01/11/22 0244

## 2022-01-11 NOTE — ED TRIAGE NOTES
"pt states he just finished quarantine for covid - reports he feels the muscles around his lungs feel \"locked up\" and he cant get comfortable. on 3 liters on home o2.   "

## 2022-01-11 NOTE — PHARMACY-ADMISSION MEDICATION HISTORY
Pharmacy Medication History  Admission medication history interview status for the 1/10/2022  admission is complete. See EPIC admission navigator for prior to admission medications     Location of Interview: Phone  Medication history sources: Patient    Significant changes made to the medication list:  Removed ibuprofen  Added acetaminiphen  Removed doxycycline     In the past week, patient estimated taking medication this percent of the time: greater than 90%    Additional medication history information:   None, finished dexamethasone course 3 days ago    Medication reconciliation completed by provider prior to medication history? No    Time spent in this activity: 5 minutes    Prior to Admission medications    Medication Sig Last Dose Taking? Auth Provider   acetaminophen (TYLENOL) 325 MG tablet Take 325-650 mg by mouth every 6 hours as needed for mild pain PRN Yes Unknown, Entered By History   benzonatate (TESSALON) 100 MG capsule Take 1 capsule (100 mg) by mouth 3 times daily as needed for cough PRN Yes Alfredito Dickinson, DO   rivaroxaban ANTICOAGULANT (XARELTO) 10 MG TABS tablet Take 1 tablet (10 mg) by mouth daily (with dinner) 1/9/2022 Yes Alfredito Dickinson, DO   dexamethasone (DECADRON) 6 MG tablet Take 1 tablet (6 mg) by mouth daily (with breakfast) for 4 days   Alfredito Dickinson, DO       The information provided in this note is only as accurate as the sources available at the time of update(s)

## 2022-01-11 NOTE — ED NOTES
Pt reports feeling best when he is sitting upright. Minimal improvement in breathing he reports after duoneb.

## 2022-01-11 NOTE — H&P
[facility]    History and Physical  Hospitalist       Date of Admission:  1/10/2022  Date of Service (when I saw the patient): 01/11/22    ASSESSMENT  Adin Meade is a markedly pleasant 27 year old gentleman with past medical history that is most notable for recently diagnosed and treated pneumonia due to COVID-19, who presents with recurrent dyspnea and is found to have suspected acute hypoxemic respiratory distress due to worsening bilateral COVID-19 pneumonia.    PLAN    Suspected acute hypoxemic respiratory distress due to worsening bilateral COVID-19 pneumonia: Of note, he is reportedly unvaccinated for COVID; his COVID symptoms started around 12/24/2021. He was admitted 12/31/2021 for bilateral pneumonia on CTPA, and also tested positive for COVID on that date. He required oxygen while hospitalized and was treated with steroids and Remdesivir. He was also treated for JOVANNI and Type 2 NSTEMI; TTE showed preserved LVEF. Eventually he was stabilized and discharged 1/4/2022 to continue 1 L O2 with exertion. Now he returns one week later with worsening dyspnea in the past 1-2 days. He is wheezing in the ED and now requires 3 L O2 to keep sats above 90%. CTPA tonight was limited in evaluation for PE; bilateral ground glass opacifications previously seen seem to have worsened. We suspect acute reactive airways disease exacerbation due to COVID, possibly ongoing or worsening COVID pneumonia. He has concomitant leukocytosis which could be related to recent steroid use causing stress demargination vs acute bacterial infection. We will treat for now for possible post-viral CAP pneumonia.    --Precautions ordered. Telemetry. Continuous pulse oximetry ordered. Aggressive IS. Maintain oxygen saturations of 90% with 3 L O2 as needed. Wean as able. CBC, CMP, CRP, D-Dimer ordered.    -- Repeat COVID swab and check Influenza. Empiric Ceftriaxone and Azithromycin ordered. Follow up cultures.    --  Aggressive nebs standing and q 2 prn; 125 mg IV Solu medrol q 12 hours ordered. Robitussin as needed    -- Repeat TTE ordered; consider a course of Lasix pending results    -- VTE chemoprophylaxis with Lovenox ordered as per protocol.    -- ISS insulin ordered while IV steroids are given    Severe morbid obesity: Body mass index is 53.28 kg/m .     -- He is at increased risk of morbidity and mortality from COVID pneumonia due to severe obesity    -- Close outpatient follow up will be needed at discharge    Chief Complaint   Dyspnea    History is obtained from the patient and the ED physician whom I have spoken with    History of Present Illness   Adin Meade is a markedly pleasant 27 year old gentleman who presents with dyspnea. This started initially around 12/24/2021 and was associated with cough, fevers, fatigue, anosmia, myalgias and headache. He came to the ED for further evaluation on 12/31 and was diagnosed with COVID and pneumonia due to COVID, and was admitted. He was treated with steroids, Remdesvir and anticoagulation as well as supplemental oxygen, and eventually stabilized and discharged with one liter home oxygen to use with exertion, on 1/4/22. He completed a course of steroids at home, and hs been taking several tablets a day of Tylenol for relief of ongoing symptoms at home, and also took several Doxycycline tablets that he had at home for treatment of a pilonidal cyst, and he says initially he was feeling significantly better at home, all other symptoms having resolved but his breathing never really having gotten back to normal. Now for the past couple of days it hs become very labored again, and he has a lot of tightness in his back and lower leg muscles as well. Tonight the breathing worsened further and he came back. He otherwise denies any chest pain currently, or fever, or worsening leg swelling, or any diarrhea or nausea or abdominal pain; or any other acute complaints.    In the ED,  "Blood pressure 139/67, pulse 95, temperature 97.1  F (36.2  C), temperature source Temporal, resp. rate 22, weight (!) 188.2 kg (415 lb), SpO2 98 %.    Currently he requires 3 L supplemental O2 by NC to keep sats above 90% in the ED.    CBC and CMP were notable for WBC 18.8, alb 3.1, glucose 105, otherwise were within the normal reference range. Lactate was 1.0. Pro-calcitonin negative. Troponin negative at 42.     CT PA showed: \"IMPRESSION:  1.  Poor contrast opacification of the pulmonary artery. No central pulmonary embolus, though pulmonary embolus cannot be excluded based on this study. If clinical concern persists, consider repeat CT PE protocol.  2.  CT findings consistent with worsening COVID pneumonia/ARDS, recommend follow-up to resolution and exclude superimposed bacterial infection.\"    Bilateral LE US showed: \"IMPRESSION:  1.  No deep venous thrombosis in the bilateral lower extremities.\"    He was given 6 mg IV Decadron as well as Ceftriaxone, Azithromycin, Toradol, and Dilaudid in the ED.     PHYSICAL EXAM  Blood pressure 139/67, pulse 95, temperature 97.1  F (36.2  C), temperature source Temporal, resp. rate 22, weight (!) 188.2 kg (415 lb), SpO2 98 %.  Constitutional: Alert and oriented to person, place and time; appears uncomfortable and ill; severe morbid obesity  HEENT: normocephalic moist mucus membranes  Respiratory: lungs have extensive wheezes to auscultation bilaterally  Cardiovascular: regular S1 S2  GI: abdomen soft non tender non distended bowel sounds positive  Lymph/Hematologic: no pallor, no cervical lymphadenopathy  Skin: no rash, good turgor  Musculoskeletal: mild bilateral LE edema  Neurologic: extra-ocular muscles intact; moves all four extremities  Psychiatric: very anxious affect, appropriate insight and judgment     DVT Prophylaxis: Enoxaparin (Lovenox) SQ  Code Status: Full Code    Disposition: Expected discharge in 2-5 days    Melchor Acosta MD    Past Medical History  "   I have reviewed this patient's medical history and updated it with pertinent information if needed.   Past Medical History:   Diagnosis Date     Obesity      Pilonidal cyst        Past Surgical History   I have reviewed this patient's surgical history and updated it with pertinent information if needed.  Past Surgical History:   Procedure Laterality Date     NO HISTORY OF SURGERY         Prior to Admission Medications   Prior to Admission Medications   Prescriptions Last Dose Informant Patient Reported? Taking?   benzonatate (TESSALON) 100 MG capsule   No No   Sig: Take 1 capsule (100 mg) by mouth 3 times daily as needed for cough   dexamethasone (DECADRON) 6 MG tablet   No No   Sig: Take 1 tablet (6 mg) by mouth daily (with breakfast) for 4 days   doxycycline hyclate (VIBRA-TABS) 100 MG tablet  Self No No   Sig: Take 1 tablet (100 mg) by mouth 2 times daily   ibuprofen (ADVIL/MOTRIN) 200 MG capsule  Self Yes No   Sig: Take 600-800 mg by mouth every 6 hours as needed for fever   rivaroxaban ANTICOAGULANT (XARELTO) 10 MG TABS tablet   No No   Sig: Take 1 tablet (10 mg) by mouth daily (with dinner)      Facility-Administered Medications: None     Allergies   No Known Allergies    Social History   I have reviewed this patient's social history and updated it with pertinent information if needed. Adin Meade  reports that he has been smoking. He has never used smokeless tobacco. He reports current alcohol use. He reports previous drug use. Drugs: Marijuana and Cocaine.    Family History   Family history assessed and, except as above, is non-contributory.    Family History   Problem Relation Age of Onset     Kidney Disease Paternal Grandmother      Cancer Paternal Grandfather        Review of Systems   The 10 point Review of Systems is negative other than noted in the HPI or here.     Primary Care Physician   Physician No Ref-Primary    Data   Labs Ordered and Resulted from Time of ED Arrival to Time of ED Departure    COMPREHENSIVE METABOLIC PANEL - Abnormal       Result Value    Sodium 136      Potassium 4.1      Chloride 105      Carbon Dioxide (CO2) 22      Anion Gap 9      Urea Nitrogen 15      Creatinine 0.80      Calcium 8.8      Glucose 105 (*)     Alkaline Phosphatase 45      AST 27      ALT 62      Protein Total 7.7      Albumin 3.1 (*)     Bilirubin Total 0.4      GFR Estimate >90     CBC WITH PLATELETS AND DIFFERENTIAL - Abnormal    WBC Count 18.8 (*)     RBC Count 5.12      Hemoglobin 14.3      Hematocrit 43.6      MCV 85      MCH 27.9      MCHC 32.8      RDW 12.8      Platelet Count 250      % Neutrophils 70      % Lymphocytes 14      % Monocytes 10      % Eosinophils 1      % Basophils 1      % Immature Granulocytes 4      NRBCs per 100 WBC 0      Absolute Neutrophils 13.5 (*)     Absolute Lymphocytes 2.6      Absolute Monocytes 1.9 (*)     Absolute Eosinophils 0.1      Absolute Basophils 0.1      Absolute Immature Granulocytes 0.7 (*)     Absolute NRBCs 0.0     TROPONIN I - Normal    Troponin I High Sensitivity 42     PROCALCITONIN - Normal    Procalcitonin <0.05     LACTIC ACID WHOLE BLOOD - Normal    Lactic Acid 1.0         Data reviewed today:  I personally reviewed the chest CT image(s) showing bilateral ground glass opacifications.    Recent Results (from the past 24 hour(s))   CT Chest Pulmonary Embolism w Contrast    Addendum: 1/11/2022    This study was addended to reflect availability of comparison CT chest dated 12/31/2021.    EXAM: CT CHEST PULMONARY EMBOLISM W CONTRAST  LOCATION: [facility]  DATE/TIME: 1/10/2022 11:54 PM    INDICATION: CP, SOB, COVID, PE  COMPARISON: 12/31/2021  TECHNIQUE: CT chest pulmonary angiogram during arterial phase injection of IV contrast. Multiplanar reformats and MIP reconstructions were performed. Dose reduction techniques were used.   CONTRAST: 160mL Isovue 370.    FINDINGS:  ANGIOGRAM CHEST: Poor opacification of the main pulmonary arteries  despite 2 attempts with mean Hounsfield units on second attempt of 177 Hounsfield units. No large central pulmonary embolus, though pulmonary embolus cannot be excluded based on this   study. Thoracic aorta is negative for dissection. No CT evidence of right heart strain.    LUNGS AND PLEURA: Worsening, diffuse, multifocal groundglass nodular opacities consistent with COVID pneumonia in the proper clinical context, recommend follow-up to resolution. No pneumothorax or pleural effusion.    MEDIASTINUM/AXILLAE: Normal.    CORONARY ARTERY CALCIFICATION: None.    UPPER ABDOMEN: Normal.    MUSCULOSKELETAL: Normal.    IMPRESSION:  1.  Poor contrast opacification of the pulmonary artery. No central pulmonary embolus, though pulmonary embolus cannot be excluded based on this study. If clinical concern persists, consider repeat CT PE protocol.  2.  CT findings consistent with worsening COVID pneumonia/ARDS, recommend follow-up to resolution and exclude superimposed bacterial infection..    Commonly reported imaging features of (COVID-19) pneumonia are present. Influenza pneumonia and organizing pneumonia as can be seen in the setting of drug toxicity and connective tissue disease can cause a similar imaging pattern.        Narrative    EXAM: CT CHEST PULMONARY EMBOLISM W CONTRAST  LOCATION: [facility]  DATE/TIME: 1/10/2022 11:54 PM    INDICATION: CP, SOB, COVID, PE  COMPARISON: None.  TECHNIQUE: CT chest pulmonary angiogram during arterial phase injection of IV contrast. Multiplanar reformats and MIP reconstructions were performed. Dose reduction techniques were used.   CONTRAST: 160mL Isovue 370.    FINDINGS:  ANGIOGRAM CHEST: Poor opacification of the main pulmonary arteries despite 2 attempts with mean Hounsfield units on second attempt of 177 Hounsfield units. No large central pulmonary embolus, though pulmonary embolus cannot be excluded based on this   study. Thoracic aorta is negative for  dissection. No CT evidence of right heart strain.    LUNGS AND PLEURA: Diffuse, multifocal groundglass nodular opacities consistent with COVID pneumonia in the proper clinical context, recommend follow-up to resolution. No pneumothorax or pleural effusion.    MEDIASTINUM/AXILLAE: Normal.    CORONARY ARTERY CALCIFICATION: None.    UPPER ABDOMEN: Normal.    MUSCULOSKELETAL: Normal.      Impression    IMPRESSION:  1.  Poor contrast opacification of the pulmonary artery. No central pulmonary embolus, though pulmonary embolus cannot be excluded based on this study. If clinical concern persists, consider repeat CT PE protocol.  2.  CT findings consistent with COVID pneumonia, recommend follow-up to resolution.    Commonly reported imaging features of (COVID-19) pneumonia are present. Influenza pneumonia and organizing pneumonia as can be seen in the setting of drug toxicity and connective tissue disease can cause a similar imaging pattern.   US Lower Extremity Venous Duplex Bilateral    Narrative    EXAM: US LOWER EXTREMITY VENOUS DUPLEX BILATERAL  LOCATION: [facility]  DATE/TIME: 1/11/2022 12:01 AM    INDICATION: pain, dvt. COVID.  COMPARISON: None.  TECHNIQUE: Venous Duplex ultrasound of bilateral lower extremities with and without compression, augmentation and duplex. Color flow and spectral Doppler with waveform analysis performed.    FINDINGS: Exam includes the common femoral, femoral, popliteal veins as well as segmentally visualized deep calf veins and greater saphenous vein.     RIGHT: No deep vein thrombosis. No superficial thrombophlebitis. No popliteal cyst.    LEFT: No deep vein thrombosis. No superficial thrombophlebitis. No popliteal cyst.      Impression    IMPRESSION:  1.  No deep venous thrombosis in the bilateral lower extremities.

## 2022-01-12 LAB
ANION GAP SERPL CALCULATED.3IONS-SCNC: 5 MMOL/L (ref 3–14)
BACTERIA SPT CULT: NORMAL
BUN SERPL-MCNC: 15 MG/DL (ref 7–30)
CALCIUM SERPL-MCNC: 8.9 MG/DL (ref 8.5–10.1)
CHLORIDE BLD-SCNC: 107 MMOL/L (ref 94–109)
CO2 SERPL-SCNC: 25 MMOL/L (ref 20–32)
CREAT SERPL-MCNC: 0.66 MG/DL (ref 0.66–1.25)
CRP SERPL-MCNC: 53.2 MG/L (ref 0–8)
D DIMER PPP FEU-MCNC: 2.56 UG/ML FEU (ref 0–0.5)
ERYTHROCYTE [DISTWIDTH] IN BLOOD BY AUTOMATED COUNT: 12.6 % (ref 10–15)
GFR SERPL CREATININE-BSD FRML MDRD: >90 ML/MIN/1.73M2
GLUCOSE BLD-MCNC: 168 MG/DL (ref 70–99)
GLUCOSE BLDC GLUCOMTR-MCNC: 159 MG/DL (ref 70–99)
GLUCOSE BLDC GLUCOMTR-MCNC: 163 MG/DL (ref 70–99)
GLUCOSE BLDC GLUCOMTR-MCNC: 170 MG/DL (ref 70–99)
GLUCOSE BLDC GLUCOMTR-MCNC: 197 MG/DL (ref 70–99)
GLUCOSE BLDC GLUCOMTR-MCNC: 209 MG/DL (ref 70–99)
GRAM STAIN RESULT: NORMAL
HCT VFR BLD AUTO: 41.3 % (ref 40–53)
HGB BLD-MCNC: 13.2 G/DL (ref 13.3–17.7)
MCH RBC QN AUTO: 27.3 PG (ref 26.5–33)
MCHC RBC AUTO-ENTMCNC: 32 G/DL (ref 31.5–36.5)
MCV RBC AUTO: 85 FL (ref 78–100)
PLATELET # BLD AUTO: 285 10E3/UL (ref 150–450)
POTASSIUM BLD-SCNC: 4.7 MMOL/L (ref 3.4–5.3)
PROCALCITONIN SERPL-MCNC: <0.05 NG/ML
RBC # BLD AUTO: 4.84 10E6/UL (ref 4.4–5.9)
SODIUM SERPL-SCNC: 137 MMOL/L (ref 133–144)
WBC # BLD AUTO: 18.8 10E3/UL (ref 4–11)

## 2022-01-12 PROCEDURE — 87205 SMEAR GRAM STAIN: CPT | Performed by: HOSPITALIST

## 2022-01-12 PROCEDURE — 999N000157 HC STATISTIC RCP TIME EA 10 MIN

## 2022-01-12 PROCEDURE — 80048 BASIC METABOLIC PNL TOTAL CA: CPT | Performed by: HOSPITALIST

## 2022-01-12 PROCEDURE — 250N000011 HC RX IP 250 OP 636: Performed by: HOSPITALIST

## 2022-01-12 PROCEDURE — 99232 SBSQ HOSP IP/OBS MODERATE 35: CPT | Performed by: HOSPITALIST

## 2022-01-12 PROCEDURE — 85014 HEMATOCRIT: CPT | Performed by: HOSPITALIST

## 2022-01-12 PROCEDURE — 86140 C-REACTIVE PROTEIN: CPT | Performed by: HOSPITALIST

## 2022-01-12 PROCEDURE — 94640 AIRWAY INHALATION TREATMENT: CPT

## 2022-01-12 PROCEDURE — 120N000001 HC R&B MED SURG/OB

## 2022-01-12 PROCEDURE — 36415 COLL VENOUS BLD VENIPUNCTURE: CPT | Performed by: HOSPITALIST

## 2022-01-12 PROCEDURE — 85379 FIBRIN DEGRADATION QUANT: CPT | Performed by: HOSPITALIST

## 2022-01-12 PROCEDURE — 84145 PROCALCITONIN (PCT): CPT | Performed by: HOSPITALIST

## 2022-01-12 PROCEDURE — 250N000009 HC RX 250: Performed by: HOSPITALIST

## 2022-01-12 PROCEDURE — 250N000013 HC RX MED GY IP 250 OP 250 PS 637: Performed by: HOSPITALIST

## 2022-01-12 PROCEDURE — 94640 AIRWAY INHALATION TREATMENT: CPT | Mod: 76

## 2022-01-12 PROCEDURE — 258N000003 HC RX IP 258 OP 636: Performed by: HOSPITALIST

## 2022-01-12 RX ORDER — METHYLPREDNISOLONE SODIUM SUCCINATE 40 MG/ML
40 INJECTION, POWDER, LYOPHILIZED, FOR SOLUTION INTRAMUSCULAR; INTRAVENOUS EVERY 12 HOURS
Status: DISCONTINUED | OUTPATIENT
Start: 2022-01-12 | End: 2022-01-13

## 2022-01-12 RX ORDER — HYDROXYZINE HCL 10 MG/5 ML
10 SOLUTION, ORAL ORAL EVERY 4 HOURS PRN
Status: DISCONTINUED | OUTPATIENT
Start: 2022-01-12 | End: 2022-01-13 | Stop reason: HOSPADM

## 2022-01-12 RX ADMIN — METHYLPREDNISOLONE SODIUM SUCCINATE 40 MG: 40 INJECTION, POWDER, FOR SOLUTION INTRAMUSCULAR; INTRAVENOUS at 22:10

## 2022-01-12 RX ADMIN — ACETAMINOPHEN 975 MG: 325 TABLET, FILM COATED ORAL at 08:45

## 2022-01-12 RX ADMIN — ACETAMINOPHEN 975 MG: 325 TABLET, FILM COATED ORAL at 23:00

## 2022-01-12 RX ADMIN — ACETAMINOPHEN 975 MG: 325 TABLET, FILM COATED ORAL at 17:46

## 2022-01-12 RX ADMIN — IPRATROPIUM BROMIDE AND ALBUTEROL SULFATE 3 ML: .5; 3 SOLUTION RESPIRATORY (INHALATION) at 23:05

## 2022-01-12 RX ADMIN — IPRATROPIUM BROMIDE AND ALBUTEROL SULFATE 3 ML: .5; 3 SOLUTION RESPIRATORY (INHALATION) at 19:06

## 2022-01-12 RX ADMIN — IPRATROPIUM BROMIDE AND ALBUTEROL SULFATE 3 ML: .5; 3 SOLUTION RESPIRATORY (INHALATION) at 11:43

## 2022-01-12 RX ADMIN — INSULIN ASPART 2 UNITS: 100 INJECTION, SOLUTION INTRAVENOUS; SUBCUTANEOUS at 17:47

## 2022-01-12 RX ADMIN — METHYLPREDNISOLONE SODIUM SUCCINATE 62.5 MG: 125 INJECTION, POWDER, FOR SOLUTION INTRAMUSCULAR; INTRAVENOUS at 08:46

## 2022-01-12 RX ADMIN — INSULIN ASPART 1 UNITS: 100 INJECTION, SOLUTION INTRAVENOUS; SUBCUTANEOUS at 09:58

## 2022-01-12 RX ADMIN — ENOXAPARIN SODIUM 40 MG: 40 INJECTION SUBCUTANEOUS at 09:55

## 2022-01-12 RX ADMIN — INSULIN ASPART 1 UNITS: 100 INJECTION, SOLUTION INTRAVENOUS; SUBCUTANEOUS at 13:28

## 2022-01-12 RX ADMIN — ENOXAPARIN SODIUM 40 MG: 40 INJECTION SUBCUTANEOUS at 22:11

## 2022-01-12 RX ADMIN — CEFTRIAXONE SODIUM 2 G: 2 INJECTION, POWDER, FOR SOLUTION INTRAMUSCULAR; INTRAVENOUS at 22:11

## 2022-01-12 RX ADMIN — IPRATROPIUM BROMIDE AND ALBUTEROL SULFATE 3 ML: .5; 3 SOLUTION RESPIRATORY (INHALATION) at 08:18

## 2022-01-12 RX ADMIN — AZITHROMYCIN MONOHYDRATE 250 MG: 500 INJECTION, POWDER, LYOPHILIZED, FOR SOLUTION INTRAVENOUS at 06:45

## 2022-01-12 ASSESSMENT — ACTIVITIES OF DAILY LIVING (ADL)
ADLS_ACUITY_SCORE: 3
ADLS_ACUITY_SCORE: 3
ADLS_ACUITY_SCORE: 6
ADLS_ACUITY_SCORE: 4
ADLS_ACUITY_SCORE: 3
ADLS_ACUITY_SCORE: 4
ADLS_ACUITY_SCORE: 3
ADLS_ACUITY_SCORE: 4
ADLS_ACUITY_SCORE: 6
ADLS_ACUITY_SCORE: 3
ADLS_ACUITY_SCORE: 3
ADLS_ACUITY_SCORE: 6
ADLS_ACUITY_SCORE: 4
ADLS_ACUITY_SCORE: 3
ADLS_ACUITY_SCORE: 4
ADLS_ACUITY_SCORE: 4
ADLS_ACUITY_SCORE: 6
ADLS_ACUITY_SCORE: 4
ADLS_ACUITY_SCORE: 3

## 2022-01-12 NOTE — PLAN OF CARE
A&Ox4  VSS  Pain: managed with tylenol and oxycodone  Respiration: clear/diminished/No SOB, Neb tx given by RT. On 3L O2 NC   Heart rate: regular  Urination: Voiding without difficulty. Continent of B&B  Bowel movement:  yes  Ambulation:  independent w/o  assistive device  On tele NSR. Sputum collected and sent to lab. Result pending.

## 2022-01-12 NOTE — PLAN OF CARE
Patient is admitted to the unit at about 0830 hours on 1/11/2022. Diagnosis: Covid-19 hypoxia. Iso precaution.  VSS. Infrequent dry cough. On tele. NSR. 2 liters supplemental oxygen. Also complain of upper back pain. Pain managed by a one time dose of Oxycodone and Robaxin.  Independent in room. Echocardiograam done today. Sputum culture collected.

## 2022-01-12 NOTE — PROGRESS NOTES
[facility]    Hospitalist Progress Note      Assessment & Plan   Adin Meade is a 27 year old male who was admitted on 1/10/2022 with past medical history that is most notable for recently diagnosed and treated pneumonia due to COVID-19, who presents with recurrent dyspnea and is found to have suspected acute hypoxemic respiratory distress due to worsening bilateral COVID-19 pneumonia.    Suspected acute hypoxemic respiratory distress due to worsening bilateral COVID-19 pneumonia  Of note, he is reportedly unvaccinated for COVID; his COVID symptoms started around 12/24/2021. He was admitted 12/31/2021 for bilateral pneumonia on CTPA, and also tested positive for COVID on that date. He required oxygen while hospitalized and was treated with steroids and Remdesivir. He was also treated for JOVANNI and Type 2 NSTEMI; TTE showed preserved LVEF. Eventually he was stabilized and discharged 1/4/2022 to continue 1 L O2 with exertion. Now he returns one week later with worsening dyspnea in the past 1-2 days. He is wheezing in the ED and now requires 3 L O2 to keep sats above 90%. CTPA tonight was limited in evaluation for PE; bilateral ground glass opacifications previously seen seem to have worsened. We suspect acute reactive airways disease exacerbation due to COVID, possibly ongoing or worsening COVID pneumonia. He has concomitant leukocytosis which could be related to recent steroid use causing stress demargination vs acute bacterial infection. We will treat for now for possible post-viral CAP pneumonia. procal <0.05. FLU negative  * echo 1/11: EF 60-65% with mild concentric LVH. RV normal.  - continue precautions  - monitor O2, titrate to 90-96%   - encourage IS   - ceftriaxone/azithromycin added on admission, stop rocephin after 3rd dose, plan to complete azithromycin 5 days  - Wean solumedrol, 40mg q12h  - duonebs q4h while awake, albuterol nebs available--will likely discharge with nebulizer  machine  - sputum culture with oral contamination, low yield--no need to repeat  - lovenox 40mg q12h, hold PTA xarelto  - ISS insulin ordered while IV steroids are given  - trend covid inflammatory markers     Severe morbid obesity: Body mass index is 53.28 kg/m .     -- He is at increased risk of morbidity and mortality from COVID pneumonia due to severe obesity    -- Close outpatient follow up will be needed at discharge    Clinically Significant Risk Factors Present on Admission                  DVT Prophylaxis: Enoxaparin (Lovenox) SQ  Code Status: Full Code  Expected Discharge: 01/13/2022     Anticipated discharge location:  Awaiting care coordination huddle  Delays:     pending respiratory improvement      Kimberly An, DO  Hospitalist Service  [facility]  Securely message with the Vocera Web Console (learn more here)  Text Page (7am - 6pm) via [employer] Paging/Directory      Interval History   Patient seen and examined. He feels better with the nebulizers, when he gets them. Missed a few nebs yesterday due to availability of RT. Headache today, not much improved after tylenol. Does not think related to caffeine. Minimal blood in sputum. Weaned off oxygen this afternoon and stable there. Would like a nebulizer machine on discharge.    -Data reviewed today: I reviewed all new labs and imaging results over the last 24 hours. I personally reviewed no images or EKG's today.    Physical Exam   Temp: 98.1  F (36.7  C) Temp src: Oral BP: (!) 150/75 Pulse: 95   Resp: 18 SpO2: 97 % O2 Device: None (Room air) Oxygen Delivery: 2 LPM  Vitals:    01/10/22 2150   Weight: (!) 188.2 kg (415 lb)     Vital Signs with Ranges  Temp:  [98  F (36.7  C)-98.7  F (37.1  C)] 98.1  F (36.7  C)  Pulse:  [] 95  Resp:  [18] 18  BP: (124-154)/(73-89) 150/75  SpO2:  [92 %-98 %] 97 %  I/O last 3 completed shifts:  In: 660 [P.O.:660]  Out: -     Constitutional: Awake, alert, cooperative, no apparent  distress  Respiratory: Clear to auscultation bilaterally, no crackles or wheezing on auscultation  Cardiovascular: Regular rate and rhythm, normal S1 and S2, and no murmur noted  GI: Normal bowel sounds, soft, non-distended, non-tender, obese  Skin/Integumen: No rashes, no cyanosis, trace lower extremity edema  Other:     Medications       acetaminophen  975 mg Oral Q8H IS     azithromycin  250 mg Intravenous Q24H     cefTRIAXone  2 g Intravenous Q24H     enoxaparin ANTICOAGULANT  40 mg Subcutaneous Q12H     insulin aspart  1-7 Units Subcutaneous TID AC     insulin aspart  1-5 Units Subcutaneous At Bedtime     ipratropium - albuterol 0.5 mg/2.5 mg/3 mL  3 mL Nebulization Q4H While awake     methylPREDNISolone  40 mg Intravenous Q12H     sodium chloride (PF)  3 mL Intracatheter Q8H       Data   Recent Labs   Lab 01/12/22  1705 01/12/22  1257 01/12/22  0842 01/12/22  0621 01/11/22  1344 01/10/22  2158   WBC  --   --   --  18.8*  --  18.8*   HGB  --   --   --  13.2*  --  14.3   MCV  --   --   --  85  --  85   PLT  --   --   --  285  --  250   NA  --   --   --  137  --  136   POTASSIUM  --   --   --  4.7  --  4.1   CHLORIDE  --   --   --  107  --  105   CO2  --   --   --  25  --  22   BUN  --   --   --  15  --  15   CR  --   --   --  0.66  --  0.80   ANIONGAP  --   --   --  5  --  9   SINCERE  --   --   --  8.9  --  8.8   * 163* 170* 168*   < > 105*   ALBUMIN  --   --   --   --   --  3.1*   PROTTOTAL  --   --   --   --   --  7.7   BILITOTAL  --   --   --   --   --  0.4   ALKPHOS  --   --   --   --   --  45   ALT  --   --   --   --   --  62   AST  --   --   --   --   --  27    < > = values in this interval not displayed.       No results found for this or any previous visit (from the past 24 hour(s)).

## 2022-01-13 VITALS
HEART RATE: 90 BPM | TEMPERATURE: 98.3 F | BODY MASS INDEX: 53.28 KG/M2 | OXYGEN SATURATION: 95 % | RESPIRATION RATE: 18 BRPM | DIASTOLIC BLOOD PRESSURE: 63 MMHG | SYSTOLIC BLOOD PRESSURE: 122 MMHG | WEIGHT: 315 LBS

## 2022-01-13 LAB
ANION GAP SERPL CALCULATED.3IONS-SCNC: 5 MMOL/L (ref 3–14)
BUN SERPL-MCNC: 13 MG/DL (ref 7–30)
CALCIUM SERPL-MCNC: 9 MG/DL (ref 8.5–10.1)
CHLORIDE BLD-SCNC: 106 MMOL/L (ref 94–109)
CO2 SERPL-SCNC: 25 MMOL/L (ref 20–32)
CREAT SERPL-MCNC: 0.66 MG/DL (ref 0.66–1.25)
CRP SERPL-MCNC: 13.8 MG/L (ref 0–8)
D DIMER PPP FEU-MCNC: 1.84 UG/ML FEU (ref 0–0.5)
ERYTHROCYTE [DISTWIDTH] IN BLOOD BY AUTOMATED COUNT: 12.9 % (ref 10–15)
GFR SERPL CREATININE-BSD FRML MDRD: >90 ML/MIN/1.73M2
GLUCOSE BLD-MCNC: 145 MG/DL (ref 70–99)
GLUCOSE BLDC GLUCOMTR-MCNC: 131 MG/DL (ref 70–99)
GLUCOSE BLDC GLUCOMTR-MCNC: 162 MG/DL (ref 70–99)
GLUCOSE BLDC GLUCOMTR-MCNC: 188 MG/DL (ref 70–99)
HCT VFR BLD AUTO: 42.3 % (ref 40–53)
HGB BLD-MCNC: 13.3 G/DL (ref 13.3–17.7)
MCH RBC QN AUTO: 27.3 PG (ref 26.5–33)
MCHC RBC AUTO-ENTMCNC: 31.4 G/DL (ref 31.5–36.5)
MCV RBC AUTO: 87 FL (ref 78–100)
PLATELET # BLD AUTO: 326 10E3/UL (ref 150–450)
POTASSIUM BLD-SCNC: 4.6 MMOL/L (ref 3.4–5.3)
RBC # BLD AUTO: 4.87 10E6/UL (ref 4.4–5.9)
SODIUM SERPL-SCNC: 136 MMOL/L (ref 133–144)
WBC # BLD AUTO: 18.1 10E3/UL (ref 4–11)

## 2022-01-13 PROCEDURE — 250N000009 HC RX 250: Performed by: HOSPITALIST

## 2022-01-13 PROCEDURE — 250N000012 HC RX MED GY IP 250 OP 636 PS 637: Performed by: HOSPITALIST

## 2022-01-13 PROCEDURE — 94640 AIRWAY INHALATION TREATMENT: CPT

## 2022-01-13 PROCEDURE — 85027 COMPLETE CBC AUTOMATED: CPT | Performed by: HOSPITALIST

## 2022-01-13 PROCEDURE — 86140 C-REACTIVE PROTEIN: CPT | Performed by: HOSPITALIST

## 2022-01-13 PROCEDURE — 258N000003 HC RX IP 258 OP 636: Performed by: HOSPITALIST

## 2022-01-13 PROCEDURE — 85379 FIBRIN DEGRADATION QUANT: CPT | Performed by: HOSPITALIST

## 2022-01-13 PROCEDURE — 80048 BASIC METABOLIC PNL TOTAL CA: CPT | Performed by: HOSPITALIST

## 2022-01-13 PROCEDURE — 999N000157 HC STATISTIC RCP TIME EA 10 MIN

## 2022-01-13 PROCEDURE — 250N000011 HC RX IP 250 OP 636: Performed by: HOSPITALIST

## 2022-01-13 PROCEDURE — 99239 HOSP IP/OBS DSCHRG MGMT >30: CPT | Performed by: HOSPITALIST

## 2022-01-13 PROCEDURE — 250N000013 HC RX MED GY IP 250 OP 250 PS 637: Performed by: HOSPITALIST

## 2022-01-13 PROCEDURE — 36415 COLL VENOUS BLD VENIPUNCTURE: CPT | Performed by: HOSPITALIST

## 2022-01-13 PROCEDURE — 94640 AIRWAY INHALATION TREATMENT: CPT | Mod: 76

## 2022-01-13 RX ORDER — ACETAMINOPHEN 325 MG/1
975 TABLET ORAL EVERY 8 HOURS
Qty: 100 TABLET | Refills: 0 | Status: SHIPPED | OUTPATIENT
Start: 2022-01-13

## 2022-01-13 RX ORDER — IPRATROPIUM BROMIDE AND ALBUTEROL SULFATE 2.5; .5 MG/3ML; MG/3ML
3 SOLUTION RESPIRATORY (INHALATION)
Qty: 90 ML | Refills: 0 | Status: SHIPPED | OUTPATIENT
Start: 2022-01-13

## 2022-01-13 RX ORDER — PREDNISONE 10 MG/1
TABLET ORAL
Qty: 20 TABLET | Refills: 0 | Status: SHIPPED | OUTPATIENT
Start: 2022-01-13 | End: 2023-10-17

## 2022-01-13 RX ORDER — PREDNISONE 20 MG/1
40 TABLET ORAL DAILY
Status: DISCONTINUED | OUTPATIENT
Start: 2022-01-13 | End: 2022-01-13 | Stop reason: HOSPADM

## 2022-01-13 RX ORDER — GUAIFENESIN 200 MG/10ML
200 LIQUID ORAL EVERY 4 HOURS PRN
Qty: 118 ML | Refills: 0 | Status: SHIPPED | OUTPATIENT
Start: 2022-01-13 | End: 2022-01-13

## 2022-01-13 RX ORDER — AZITHROMYCIN 250 MG/1
250 TABLET, FILM COATED ORAL DAILY
Status: DISCONTINUED | OUTPATIENT
Start: 2022-01-14 | End: 2022-01-13 | Stop reason: HOSPADM

## 2022-01-13 RX ORDER — AZITHROMYCIN 250 MG/1
250 TABLET, FILM COATED ORAL DAILY
Qty: 2 TABLET | Refills: 0 | Status: SHIPPED | OUTPATIENT
Start: 2022-01-14 | End: 2023-10-17

## 2022-01-13 RX ADMIN — IPRATROPIUM BROMIDE AND ALBUTEROL SULFATE 3 ML: .5; 3 SOLUTION RESPIRATORY (INHALATION) at 07:45

## 2022-01-13 RX ADMIN — GUAIFENESIN AND DEXTROMETHORPHAN 10 ML: 100; 10 SYRUP ORAL at 13:16

## 2022-01-13 RX ADMIN — ACETAMINOPHEN 975 MG: 325 TABLET, FILM COATED ORAL at 09:31

## 2022-01-13 RX ADMIN — AZITHROMYCIN MONOHYDRATE 250 MG: 500 INJECTION, POWDER, LYOPHILIZED, FOR SOLUTION INTRAVENOUS at 06:59

## 2022-01-13 RX ADMIN — PREDNISONE 40 MG: 20 TABLET ORAL at 09:31

## 2022-01-13 RX ADMIN — ENOXAPARIN SODIUM 40 MG: 40 INJECTION SUBCUTANEOUS at 09:32

## 2022-01-13 RX ADMIN — ACETAMINOPHEN 975 MG: 325 TABLET, FILM COATED ORAL at 15:11

## 2022-01-13 RX ADMIN — IPRATROPIUM BROMIDE AND ALBUTEROL SULFATE 3 ML: .5; 3 SOLUTION RESPIRATORY (INHALATION) at 16:05

## 2022-01-13 RX ADMIN — INSULIN ASPART 1 UNITS: 100 INJECTION, SOLUTION INTRAVENOUS; SUBCUTANEOUS at 13:18

## 2022-01-13 ASSESSMENT — ACTIVITIES OF DAILY LIVING (ADL)
ADLS_ACUITY_SCORE: 4

## 2022-01-13 NOTE — PLAN OF CARE
A&O x 4. DX: COVID 19. Covid -19 Iso precautions in place. On sliding scale insulin. C/O headaches throughout shift. Patient remained under room air throughout shift with O2 Sats above 90%. Infrequent dry cough. Lung sounds clear in all fields this morning.  On Tele. NSR.

## 2022-01-13 NOTE — PROGRESS NOTES
Patient is alert and oriented times 4. Independent, VSS stable on RA. Denied pain  this shift. Voiding without difficulty, continent of B&B. No SOB, incentive spirometer up to 2750, intermittent abx. Continue to monitor.

## 2022-01-13 NOTE — DISCHARGE SUMMARY
[facility]    Discharge Summary  Hospitalist    Date of Admission:  1/10/2022  Date of Discharge:  1/13/2022  Discharging Provider: Kimberly An DO  Date of Service (when I saw the patient): 01/13/22    Discharge Diagnoses   Acute hypoxemic respiratory distress due to worsening bilateral COVID-19 pneumonia  Severe morbid obesity  Tobacco abuse    History of Present Illness   Adin Meade is an 27 year old male who presented with recurrent dyspnea, found to have acute hypoxemic respiratory distress due to worsening bilateral COVID-19 pneumonia.    Hospital Course   Adin Meade was admitted on 1/10/2022.  The following problems were addressed during his hospitalization:    Acute hypoxemic respiratory distress due to worsening bilateral COVID-19 pneumonia:  Not vaccinated for COVID. COVID symptoms started around 12/24/2021. He was admitted 12/31/2021 for bilateral pneumonia on CTPA, and tested positive for COVID. He required oxygen while hospitalized and was treated with steroids and Remdesivir. He was also treated for JOVANNI and Type 2 NSTEMI; TTE showed preserved LVEF. Eventually he was stabilized and discharged 1/4/2022 to continue 1 L O2 with exertion. He returned 1/10 with worsening dyspnea in the past 1-2 days. He was wheezing in the ED and required 3 L O2 to keep sats above 90%. CTPA tonight was limited in evaluation for PE; bilateral ground glass opacifications previously seen seem to have worsened. We suspect acute reactive airways disease exacerbation due to COVID, possibly ongoing or worsening COVID pneumonia. He has concomitant leukocytosis which could be related to recent steroid use causing stress demargination vs acute bacterial infection. procal <0.05, making bacterial infection less likely. FLU negative  * Echo 1/11: EF 60-65% with mild concentric LVH. RV normal.  - weaned off oxygen on 1/12 and since stable, he will return his home oxygen prior to discharge  - covid  inflammatory markers improved daily  - encourage IS   - ceftriaxone/azithromycin added on admission, discontinued ceftriaxone, but discharging with 2 more days of azithromycin for anti-inflammatory contribution  - initially on IV solumedrol, weaned to prednisone on 1/13, discharging with 8 day taper (40mgx2 days, 30mg x2 days, 20mg x2 day and 10mg x2 days).   - duonebs q4h while awake and then as needed after discharge, nebulizer machine ordered as patient found the most benefit in respiratory status with addition of nebs. Likely some component of reactive airway disease underlying this (has a smoking history as well)  - Tylenol 3x daily for 3 more days then use as needed  - received lovenox in hospital, complete course with xarelto on discharge  - establish care with PCP and follow-up in one week  - COVID get well loop on discharge     Severe morbid obesity: Body mass index is 53.28 kg/m .   - He is at increased risk of morbidity and mortality from COVID pneumonia due to severe obesity  - establish care with PCP and follow-up    Tobacco abuse  Last cigarette almost 3 weeks ago, encouraged continued cessation    Kimberly An, DO    Significant Results and Procedures   See above     Pending Results   NA    Code Status   Full Code       Primary Care Physician   Physician No Ref-Primary    Physical Exam   Temp: 98.3  F (36.8  C) Temp src: Oral BP: 122/63 Pulse: 90   Resp: 18 SpO2: 94 % O2 Device: None (Room air)    Vitals:    01/10/22 2150   Weight: (!) 188.2 kg (415 lb)     Vital Signs with Ranges  Temp:  [98.1  F (36.7  C)-98.8  F (37.1  C)] 98.3  F (36.8  C)  Pulse:  [90-95] 90  Resp:  [16-18] 18  BP: (119-150)/(63-83) 122/63  SpO2:  [94 %-99 %] 94 %  No intake/output data recorded.    Patient seen and examined on day of discharge. He is doing well. Off of oxygen still. Breathing much better with nebulizers. Headache overall better. No chest pain, shortness of breath at rest, nausea, vomiting. Has some nasal  congestion after a nap, but otherwise not having much congestion. Cough has improved as well, no longer with streaks of blood. Stable for discharge to home with family.     Constitutional: Awake, alert, cooperative, no apparent distress.  Eyes: Conjunctiva and pupils examined and normal.  HEENT: Moist mucous membranes, normal dentition. Mild nasal congestion  Respiratory: decreased but clear to auscultation bilaterally, no crackles or wheezing.  Cardiovascular: Regular rate and rhythm, normal S1 and S2, and no murmur noted.  GI: Soft, non-distended, non-tender, normal bowel sounds, obese  Lymph/Hematologic: No anterior cervical or supraclavicular adenopathy.  Skin: No rashes, no cyanosis, no edema.  Musculoskeletal: No joint swelling, erythema or tenderness.  Neurologic: Cranial nerves 2-12 intact, normal strength and sensation.  Psychiatric: Alert, oriented to person, place and time, no obvious anxiety or depression.    Discharge Disposition   Discharged to home  Condition at discharge: Stable    Consultations This Hospital Stay   None    Time Spent on this Encounter   IKimberly DO, personally saw the patient today and spent greater than 30 minutes discharging this patient.    Discharge Orders      COVID-19 GetWell Loop Referral      Follow-up and recommended labs and tests     Follow up with primary care provider, Physician No Ref-Primary, within 7 days for hospital follow- up.  No follow up labs or test are needed.    An appointment has been made for you at the [facility]   January 19th at 12:30 PM with Rosalba Dominguez NP    Address: [address] [place] [address]  Phone: [phone]     Activity    Your activity upon discharge: activity as tolerated, hang out at home for the next few days     Reason for your hospital stay    Ongoing COVID symptoms     Incentive Spirometry    Continue to use incentive spirometer 4 times a day Until return to normal activity      Discharge - Quarantine/Isolation Instruction    Date of symptom onset:   12/24/21  Date of first positive test:  12/31/21  Stay home and away from others (self-isolate) for at least 20 days from when your symptoms started And...   You've had no fevers and no medicine that reduces fever for 1 full day (24 hours)  And...   Your other symptoms have resolved (gotten better).     Contact provider    Contact your primary care provider if If increased trouble breathing, new arm/leg swelling, dizziness/passing out, falls, bleeding that doesn't stop, or uncontrolled pain.     Discharge Instructions    1. Take prednisone once daily for next 8 days, starting at 40mg once daily x2 days, then 30mg for 2 days, 20mg for 2 days, 10mg for 2 days.    2. Azithromycin 250mg once daily for 2 more days    3. Take tylenol 975mg every 8 hours for next 3 days then take as needed    4. Use duonebs every 4 hours while awake for a few days, then start to take them less frequently (ever 6 hours or just in morning at night) as you feel better    5. Finish your prescription for xarelto from your last hospitalization    6. Take nyquil/dayquil as needed, but do not exceed more than 4000mg of acetaminophen (tylenol) in one day.     Nebulizer and Nebulizer Supplies    Nebulizer/Supplies Documentation:   The patient was seen 01/13/2022. After assessment, the patient will need to be treated with ongoing nebulizer for treatment/management of COVID-19 infection.    I, the undersigned, certify that the above prescribed supplies are medically necessary for this patient and is both reasonable and necessary in reference to accepted standards of medical and necessary in reference to accepted standards of medical practice in the treatment of this patient's condition and is not prescribed as a convenience.     Diet    Follow this diet upon discharge: Regular Diet Adult     Discharge Medications   Current Discharge Medication List      START taking these  medications    Details   azithromycin (ZITHROMAX) 250 MG tablet Take 1 tablet (250 mg) by mouth daily  Qty: 2 tablet, Refills: 0    Comments: Future refills by PCP  Physician No Ref-Primary with phone number None.  Associated Diagnoses: Pneumonia due to 2019 novel coronavirus      ipratropium - albuterol 0.5 mg/2.5 mg/3 mL (DUONEB) 0.5-2.5 (3) MG/3ML neb solution Take 1 vial (3 mLs) by nebulization every 4 hours (while awake) Can decrease how often you are using it as you feel better  Qty: 90 mL, Refills: 0    Comments: Future refills by PCP  Physician No Ref-Primary with phone number None.  Associated Diagnoses: Pneumonia due to 2019 novel coronavirus      predniSONE (DELTASONE) 10 MG tablet 4 tabs daily for 2 days, then 3 tabs daily for 2 days, then 2 tabs daily for 2 days, then 1 tab daily for 2 days, then stop  Qty: 20 tablet, Refills: 0    Comments: Future refills by PCP  Physician No Ref-Primary with phone number None.  Associated Diagnoses: Pneumonia due to 2019 novel coronavirus         CONTINUE these medications which have CHANGED    Details   acetaminophen (TYLENOL) 325 MG tablet Take 3 tablets (975 mg) by mouth every 8 hours For 3 more days, then take as needed  Qty: 100 tablet, Refills: 0    Comments: Future refills by PCP  Physician No Ref-Primary with phone number None.  Associated Diagnoses: Pneumonia due to 2019 novel coronavirus         CONTINUE these medications which have NOT CHANGED    Details   rivaroxaban ANTICOAGULANT (XARELTO) 10 MG TABS tablet Take 1 tablet (10 mg) by mouth daily (with dinner)  Qty: 30 tablet, Refills: 0    Associated Diagnoses: Pneumonia due to 2019 novel coronavirus         STOP taking these medications       benzonatate (TESSALON) 100 MG capsule Comments:   Reason for Stopping:         dexamethasone (DECADRON) 6 MG tablet Comments:   Reason for Stopping:             Allergies   Allergies   Allergen Reactions     Guaifenesin & Derivatives Difficulty breathing      Data   Most Recent 3 CBC's:  Recent Labs   Lab Test 01/13/22  0634 01/12/22  0621 01/10/22  2158   WBC 18.1* 18.8* 18.8*   HGB 13.3 13.2* 14.3   MCV 87 85 85    285 250      Most Recent 3 BMP's:  Recent Labs   Lab Test 01/13/22  1219 01/13/22  0820 01/13/22  0634 01/12/22  0842 01/12/22  0621 01/11/22  1344 01/10/22  2158   NA  --   --  136  --  137  --  136   POTASSIUM  --   --  4.6  --  4.7  --  4.1   CHLORIDE  --   --  106  --  107  --  105   CO2  --   --  25  --  25  --  22   BUN  --   --  13  --  15  --  15   CR  --   --  0.66  --  0.66  --  0.80   ANIONGAP  --   --  5  --  5  --  9   SINCERE  --   --  9.0  --  8.9  --  8.8   * 131* 145*   < > 168*   < > 105*    < > = values in this interval not displayed.     Most Recent 2 LFT's:  Recent Labs   Lab Test 01/10/22  2158 12/31/21  0255   AST 27 40   ALT 62 48   ALKPHOS 45 47   BILITOTAL 0.4 0.4     Most Recent INR's and Anticoagulation Dosing History:  Anticoagulation Dose History    There is no flowsheet data to display.       Most Recent 3 Troponin's:No lab results found.  Most Recent Cholesterol Panel:No lab results found.  Most Recent 6 Bacteria Isolates From Any Culture (See EPIC Reports for Culture Details):No lab results found.  Most Recent TSH, T4 and A1c Labs:  Recent Labs   Lab Test 12/31/21  0255   TSH 2.39     Results for orders placed or performed during the hospital encounter of 01/10/22   CT Chest Pulmonary Embolism w Contrast    Addendum: 1/11/2022    This study was addended to reflect availability of comparison CT chest dated 12/31/2021.    EXAM: CT CHEST PULMONARY EMBOLISM W CONTRAST  LOCATION: [facility]  DATE/TIME: 1/10/2022 11:54 PM    INDICATION: CP, SOB, COVID, PE  COMPARISON: 12/31/2021  TECHNIQUE: CT chest pulmonary angiogram during arterial phase injection of IV contrast. Multiplanar reformats and MIP reconstructions were performed. Dose reduction techniques were used.   CONTRAST: 160mL Isovue  370.    FINDINGS:  ANGIOGRAM CHEST: Poor opacification of the main pulmonary arteries despite 2 attempts with mean Hounsfield units on second attempt of 177 Hounsfield units. No large central pulmonary embolus, though pulmonary embolus cannot be excluded based on this   study. Thoracic aorta is negative for dissection. No CT evidence of right heart strain.    LUNGS AND PLEURA: Worsening, diffuse, multifocal groundglass nodular opacities consistent with COVID pneumonia in the proper clinical context, recommend follow-up to resolution. No pneumothorax or pleural effusion.    MEDIASTINUM/AXILLAE: Normal.    CORONARY ARTERY CALCIFICATION: None.    UPPER ABDOMEN: Normal.    MUSCULOSKELETAL: Normal.    IMPRESSION:  1.  Poor contrast opacification of the pulmonary artery. No central pulmonary embolus, though pulmonary embolus cannot be excluded based on this study. If clinical concern persists, consider repeat CT PE protocol.  2.  CT findings consistent with worsening COVID pneumonia/ARDS, recommend follow-up to resolution and exclude superimposed bacterial infection..    Commonly reported imaging features of (COVID-19) pneumonia are present. Influenza pneumonia and organizing pneumonia as can be seen in the setting of drug toxicity and connective tissue disease can cause a similar imaging pattern.        Narrative    EXAM: CT CHEST PULMONARY EMBOLISM W CONTRAST  LOCATION: [facility]  DATE/TIME: 1/10/2022 11:54 PM    INDICATION: CP, SOB, COVID, PE  COMPARISON: None.  TECHNIQUE: CT chest pulmonary angiogram during arterial phase injection of IV contrast. Multiplanar reformats and MIP reconstructions were performed. Dose reduction techniques were used.   CONTRAST: 160mL Isovue 370.    FINDINGS:  ANGIOGRAM CHEST: Poor opacification of the main pulmonary arteries despite 2 attempts with mean Hounsfield units on second attempt of 177 Hounsfield units. No large central pulmonary embolus, though  pulmonary embolus cannot be excluded based on this   study. Thoracic aorta is negative for dissection. No CT evidence of right heart strain.    LUNGS AND PLEURA: Diffuse, multifocal groundglass nodular opacities consistent with COVID pneumonia in the proper clinical context, recommend follow-up to resolution. No pneumothorax or pleural effusion.    MEDIASTINUM/AXILLAE: Normal.    CORONARY ARTERY CALCIFICATION: None.    UPPER ABDOMEN: Normal.    MUSCULOSKELETAL: Normal.      Impression    IMPRESSION:  1.  Poor contrast opacification of the pulmonary artery. No central pulmonary embolus, though pulmonary embolus cannot be excluded based on this study. If clinical concern persists, consider repeat CT PE protocol.  2.  CT findings consistent with COVID pneumonia, recommend follow-up to resolution.    Commonly reported imaging features of (COVID-19) pneumonia are present. Influenza pneumonia and organizing pneumonia as can be seen in the setting of drug toxicity and connective tissue disease can cause a similar imaging pattern.   US Lower Extremity Venous Duplex Bilateral    Narrative    EXAM: US LOWER EXTREMITY VENOUS DUPLEX BILATERAL  LOCATION: [facility]  DATE/TIME: 1/11/2022 12:01 AM    INDICATION: pain, dvt. COVID.  COMPARISON: None.  TECHNIQUE: Venous Duplex ultrasound of bilateral lower extremities with and without compression, augmentation and duplex. Color flow and spectral Doppler with waveform analysis performed.    FINDINGS: Exam includes the common femoral, femoral, popliteal veins as well as segmentally visualized deep calf veins and greater saphenous vein.     RIGHT: No deep vein thrombosis. No superficial thrombophlebitis. No popliteal cyst.    LEFT: No deep vein thrombosis. No superficial thrombophlebitis. No popliteal cyst.      Impression    IMPRESSION:  1.  No deep venous thrombosis in the bilateral lower extremities.   Echo Limited     Value    LVEF  60-65%    Narrative     [id number]  [id number]  [id number]  [id number]^JAIME^ANTONIO^RAMESH     [facility]  [facility]  Echocardiography Laboratory  [address]  [place] [address]  Phone [phone]  Fax [phone]     Name: MADHAVI ROBERSON  MRN: [id number]  : 1995  Study Date: 2022 11:52 AM  Age: 27 yrs  Gender: Male  Patient Location: [facility]  Reason For Study: CHF  Ordering Physician: ANTONIO SANDOVAL  Referring Physician: ANTONIO SANDOVAL  Performed By: MARY JANE Gaona     BSA: 2.9 m2  Height: 73 in  Weight: 415 lb  HR: 97  BP: 138/78 mmHg  ______________________________________________________________________________  Procedure  Limited Portable Echo Adult. Optison (NDC #7872-2513) given intravenously.     ______________________________________________________________________________  Interpretation Summary     The visual ejection fraction is 60-65%.  There is mild concentric left ventricular hypertrophy.  The right ventricle is normal in size and function.  No significant valve disease.     Technically difficult study. Limited echo.  ______________________________________________________________________________  Left Ventricle  The left ventricle is normal in size. There is mild concentric left  ventricular hypertrophy. Left ventricular systolic function is normal. The  visual ejection fraction is 60-65%. Diastolic Doppler findings (E/E' ratio  and/or other parameters) suggest left ventricular filling pressures are  indeterminate.     Right Ventricle  The right ventricle is not well visualized. The right ventricle is normal in  size and function.     Atria  The left atrium is not well visualized. Normal left atrial size. Right atrial  size is normal. Right atrium not well visualized.     Mitral Valve  The mitral valve is normal in structure and function. There is trace mitral  regurgitation.     Tricuspid Valve  The tricuspid valve is normal in  structure and function.     Aortic Valve  The aortic valve is not well visualized. No aortic regurgitation is present.  No hemodynamically significant valvular aortic stenosis.     Pulmonic Valve  The pulmonic valve is not well visualized.     Vessels  The aortic root is normal size. The aortic root is not well visualized. Normal  size ascending aorta. Dilation of the inferior vena cava is present with  normal respiratory variation in diameter.     Pericardium  There is no pericardial effusion.     ______________________________________________________________________________  MMode/2D Measurements & Calculations  IVSd: 1.2 cm  LVIDd: 5.1 cm  LVIDs: 2.8 cm  LVPWd: 1.3 cm  FS: 44.8 %  LV mass(C)d: 265.8 grams  LV mass(C)dI: 90.6 grams/m2  Ao root diam: 3.1 cm  asc Aorta Diam: 3.1 cm  RWT: 0.52     Doppler Measurements & Calculations  MV E max chavo: 97.0 cm/sec  MV A max chavo: 95.6 cm/sec  MV E/A: 1.0  MV dec time: 0.17 sec  PA acc time: 0.15 sec  E/E' av.9  Lateral E/e': 10.7  Medial E/e': 7.1     ______________________________________________________________________________  Report approved by: Jerome Schuler 2022 04:43 PM

## 2022-01-14 ENCOUNTER — PATIENT OUTREACH (OUTPATIENT)
Dept: CARE COORDINATION | Facility: CLINIC | Age: 27
End: 2022-01-14
Payer: COMMERCIAL

## 2022-01-14 DIAGNOSIS — Z71.89 OTHER SPECIFIED COUNSELING: ICD-10-CM

## 2022-01-14 NOTE — PROGRESS NOTES
Clinic Care Coordination Contact  [facility]/Voicemail       Clinical Data: Care Coordinator Outreach  Outreach attempted x 1.  Left message on patient's voicemail with call back information and requested return call.  Plan:  Care Coordinator will try to reach patient again in 1-2 business days.    Ivonne Medina  Care Transitions Assistant  [facility]

## 2022-01-15 ENCOUNTER — HEALTH MAINTENANCE LETTER (OUTPATIENT)
Age: 27
End: 2022-01-15

## 2022-01-15 NOTE — PROGRESS NOTES
Clinic Care Coordination Contact  [facility]/Voicemail       Clinical Data: Care Coordinator Outreach  Outreach attempted x 2.  Left message on patient's voicemail with call back information and requested return call.  Plan: Care Coordinator will do no further outreaches at this time.        ANGELI Yee  [phone]  [facility]

## 2023-01-07 ENCOUNTER — HEALTH MAINTENANCE LETTER (OUTPATIENT)
Age: 28
End: 2023-01-07

## 2023-04-22 ENCOUNTER — HEALTH MAINTENANCE LETTER (OUTPATIENT)
Age: 28
End: 2023-04-22

## 2023-10-17 ENCOUNTER — APPOINTMENT (OUTPATIENT)
Dept: CT IMAGING | Facility: CLINIC | Age: 28
End: 2023-10-17
Attending: EMERGENCY MEDICINE
Payer: COMMERCIAL

## 2023-10-17 ENCOUNTER — HOSPITAL ENCOUNTER (EMERGENCY)
Facility: CLINIC | Age: 28
Discharge: HOME | End: 2023-10-17
Attending: EMERGENCY MEDICINE | Admitting: EMERGENCY MEDICINE
Payer: COMMERCIAL

## 2023-10-17 VITALS
RESPIRATION RATE: 16 BRPM | TEMPERATURE: 98.3 F | OXYGEN SATURATION: 95 % | DIASTOLIC BLOOD PRESSURE: 69 MMHG | BODY MASS INDEX: 53.28 KG/M2 | WEIGHT: 315 LBS | SYSTOLIC BLOOD PRESSURE: 136 MMHG | HEART RATE: 93 BPM

## 2023-10-17 DIAGNOSIS — J18.9 COMMUNITY ACQUIRED PNEUMONIA, UNSPECIFIED LATERALITY: ICD-10-CM

## 2023-10-17 DIAGNOSIS — F17.200 TOBACCO USE DISORDER: ICD-10-CM

## 2023-10-17 LAB
ALBUMIN SERPL BCG-MCNC: 4.2 G/DL (ref 3.5–5.2)
ALP SERPL-CCNC: 65 U/L (ref 40–129)
ALT SERPL W P-5'-P-CCNC: 31 U/L (ref 0–70)
ANION GAP SERPL CALCULATED.3IONS-SCNC: 12 MMOL/L (ref 7–15)
AST SERPL W P-5'-P-CCNC: 25 U/L (ref 0–45)
ATRIAL RATE - MUSE: 85 BPM
BASO+EOS+MONOS # BLD AUTO: NORMAL 10*3/UL
BASO+EOS+MONOS NFR BLD AUTO: NORMAL %
BASOPHILS # BLD AUTO: 0 10E3/UL (ref 0–0.2)
BASOPHILS NFR BLD AUTO: 0 %
BILIRUB SERPL-MCNC: 0.2 MG/DL
BUN SERPL-MCNC: 12.4 MG/DL (ref 6–20)
CALCIUM SERPL-MCNC: 8.9 MG/DL (ref 8.6–10)
CHLORIDE SERPL-SCNC: 106 MMOL/L (ref 98–107)
CREAT SERPL-MCNC: 0.77 MG/DL (ref 0.67–1.17)
DEPRECATED HCO3 PLAS-SCNC: 23 MMOL/L (ref 22–29)
DIASTOLIC BLOOD PRESSURE - MUSE: NORMAL MMHG
EGFRCR SERPLBLD CKD-EPI 2021: >90 ML/MIN/1.73M2
EOSINOPHIL # BLD AUTO: 0.1 10E3/UL (ref 0–0.7)
EOSINOPHIL NFR BLD AUTO: 1 %
ERYTHROCYTE [DISTWIDTH] IN BLOOD BY AUTOMATED COUNT: 13.2 % (ref 10–15)
FLUAV RNA SPEC QL NAA+PROBE: NEGATIVE
FLUBV RNA RESP QL NAA+PROBE: NEGATIVE
GLUCOSE SERPL-MCNC: 145 MG/DL (ref 70–99)
HCT VFR BLD AUTO: 46.1 % (ref 40–53)
HGB BLD-MCNC: 14.9 G/DL (ref 13.3–17.7)
IMM GRANULOCYTES # BLD: 0.1 10E3/UL
IMM GRANULOCYTES NFR BLD: 1 %
INTERPRETATION ECG - MUSE: NORMAL
LYMPHOCYTES # BLD AUTO: 2.5 10E3/UL (ref 0.8–5.3)
LYMPHOCYTES NFR BLD AUTO: 26 %
MCH RBC QN AUTO: 28.1 PG (ref 26.5–33)
MCHC RBC AUTO-ENTMCNC: 32.3 G/DL (ref 31.5–36.5)
MCV RBC AUTO: 87 FL (ref 78–100)
MONOCYTES # BLD AUTO: 0.5 10E3/UL (ref 0–1.3)
MONOCYTES NFR BLD AUTO: 6 %
NEUTROPHILS # BLD AUTO: 6.5 10E3/UL (ref 1.6–8.3)
NEUTROPHILS NFR BLD AUTO: 66 %
NRBC # BLD AUTO: 0 10E3/UL
NRBC BLD AUTO-RTO: 0 /100
P AXIS - MUSE: 21 DEGREES
PLATELET # BLD AUTO: 235 10E3/UL (ref 150–450)
POTASSIUM SERPL-SCNC: 4.3 MMOL/L (ref 3.4–5.3)
PR INTERVAL - MUSE: 140 MS
PROT SERPL-MCNC: 7.5 G/DL (ref 6.4–8.3)
QRS DURATION - MUSE: 90 MS
QT - MUSE: 354 MS
QTC - MUSE: 421 MS
R AXIS - MUSE: 49 DEGREES
RBC # BLD AUTO: 5.31 10E6/UL (ref 4.4–5.9)
RSV RNA SPEC NAA+PROBE: NEGATIVE
SARS-COV-2 RNA RESP QL NAA+PROBE: NEGATIVE
SODIUM SERPL-SCNC: 141 MMOL/L (ref 135–145)
SYSTOLIC BLOOD PRESSURE - MUSE: NORMAL MMHG
T AXIS - MUSE: 24 DEGREES
TROPONIN T SERPL HS-MCNC: 7 NG/L
VENTRICULAR RATE- MUSE: 85 BPM
WBC # BLD AUTO: 9.7 10E3/UL (ref 4–11)

## 2023-10-17 PROCEDURE — 96361 HYDRATE IV INFUSION ADD-ON: CPT

## 2023-10-17 PROCEDURE — 258N000003 HC RX IP 258 OP 636: Performed by: EMERGENCY MEDICINE

## 2023-10-17 PROCEDURE — 36415 COLL VENOUS BLD VENIPUNCTURE: CPT | Performed by: EMERGENCY MEDICINE

## 2023-10-17 PROCEDURE — 85025 COMPLETE CBC W/AUTO DIFF WBC: CPT | Performed by: EMERGENCY MEDICINE

## 2023-10-17 PROCEDURE — 96375 TX/PRO/DX INJ NEW DRUG ADDON: CPT | Mod: 59

## 2023-10-17 PROCEDURE — 250N000011 HC RX IP 250 OP 636: Performed by: EMERGENCY MEDICINE

## 2023-10-17 PROCEDURE — 99285 EMERGENCY DEPT VISIT HI MDM: CPT | Mod: 25

## 2023-10-17 PROCEDURE — 84484 ASSAY OF TROPONIN QUANT: CPT | Performed by: EMERGENCY MEDICINE

## 2023-10-17 PROCEDURE — 96367 TX/PROPH/DG ADDL SEQ IV INF: CPT

## 2023-10-17 PROCEDURE — 96365 THER/PROPH/DIAG IV INF INIT: CPT | Mod: 59

## 2023-10-17 PROCEDURE — 71275 CT ANGIOGRAPHY CHEST: CPT

## 2023-10-17 PROCEDURE — 94640 AIRWAY INHALATION TREATMENT: CPT

## 2023-10-17 PROCEDURE — 93005 ELECTROCARDIOGRAM TRACING: CPT

## 2023-10-17 PROCEDURE — 250N000011 HC RX IP 250 OP 636: Mod: JZ | Performed by: EMERGENCY MEDICINE

## 2023-10-17 PROCEDURE — 87637 SARSCOV2&INF A&B&RSV AMP PRB: CPT | Performed by: EMERGENCY MEDICINE

## 2023-10-17 PROCEDURE — 80053 COMPREHEN METABOLIC PANEL: CPT | Performed by: EMERGENCY MEDICINE

## 2023-10-17 PROCEDURE — 250N000009 HC RX 250: Performed by: EMERGENCY MEDICINE

## 2023-10-17 RX ORDER — CEFPODOXIME PROXETIL 200 MG/1
200 TABLET, FILM COATED ORAL 2 TIMES DAILY
Qty: 14 TABLET | Refills: 0 | Status: SHIPPED | OUTPATIENT
Start: 2023-10-17 | End: 2023-10-24

## 2023-10-17 RX ORDER — KETOROLAC TROMETHAMINE 15 MG/ML
15 INJECTION, SOLUTION INTRAMUSCULAR; INTRAVENOUS ONCE
Status: COMPLETED | OUTPATIENT
Start: 2023-10-17 | End: 2023-10-17

## 2023-10-17 RX ORDER — AZITHROMYCIN 250 MG/1
TABLET, FILM COATED ORAL
Qty: 6 TABLET | Refills: 0 | Status: SHIPPED | OUTPATIENT
Start: 2023-10-17 | End: 2023-10-22

## 2023-10-17 RX ORDER — IOPAMIDOL 755 MG/ML
76 INJECTION, SOLUTION INTRAVASCULAR ONCE
Status: COMPLETED | OUTPATIENT
Start: 2023-10-17 | End: 2023-10-17

## 2023-10-17 RX ORDER — IPRATROPIUM BROMIDE AND ALBUTEROL SULFATE 2.5; .5 MG/3ML; MG/3ML
3 SOLUTION RESPIRATORY (INHALATION) ONCE
Status: COMPLETED | OUTPATIENT
Start: 2023-10-17 | End: 2023-10-17

## 2023-10-17 RX ORDER — ALBUTEROL SULFATE 90 UG/1
2 AEROSOL, METERED RESPIRATORY (INHALATION) EVERY 6 HOURS PRN
Qty: 18 G | Refills: 0 | Status: SHIPPED | OUTPATIENT
Start: 2023-10-17

## 2023-10-17 RX ORDER — AZITHROMYCIN 500 MG/1
500 INJECTION, POWDER, LYOPHILIZED, FOR SOLUTION INTRAVENOUS ONCE
Status: COMPLETED | OUTPATIENT
Start: 2023-10-17 | End: 2023-10-17

## 2023-10-17 RX ORDER — CEFTRIAXONE 2 G/1
2 INJECTION, POWDER, FOR SOLUTION INTRAMUSCULAR; INTRAVENOUS ONCE
Status: COMPLETED | OUTPATIENT
Start: 2023-10-17 | End: 2023-10-17

## 2023-10-17 RX ORDER — BENZONATATE 100 MG/1
200 CAPSULE ORAL 3 TIMES DAILY PRN
Qty: 15 CAPSULE | Refills: 0 | Status: SHIPPED | OUTPATIENT
Start: 2023-10-17

## 2023-10-17 RX ORDER — PREDNISONE 20 MG/1
TABLET ORAL
Qty: 10 TABLET | Refills: 0 | Status: SHIPPED | OUTPATIENT
Start: 2023-10-17

## 2023-10-17 RX ORDER — METHYLPREDNISOLONE SODIUM SUCCINATE 125 MG/2ML
125 INJECTION, POWDER, LYOPHILIZED, FOR SOLUTION INTRAMUSCULAR; INTRAVENOUS ONCE
Status: COMPLETED | OUTPATIENT
Start: 2023-10-17 | End: 2023-10-17

## 2023-10-17 RX ADMIN — SODIUM CHLORIDE 1000 ML: 9 INJECTION, SOLUTION INTRAVENOUS at 14:25

## 2023-10-17 RX ADMIN — CEFTRIAXONE SODIUM 2 G: 2 INJECTION, POWDER, FOR SOLUTION INTRAMUSCULAR; INTRAVENOUS at 16:18

## 2023-10-17 RX ADMIN — IPRATROPIUM BROMIDE AND ALBUTEROL SULFATE 3 ML: .5; 3 SOLUTION RESPIRATORY (INHALATION) at 14:29

## 2023-10-17 RX ADMIN — AZITHROMYCIN MONOHYDRATE 500 MG: 500 INJECTION, POWDER, LYOPHILIZED, FOR SOLUTION INTRAVENOUS at 16:57

## 2023-10-17 RX ADMIN — METHYLPREDNISOLONE SODIUM SUCCINATE 125 MG: 125 INJECTION, POWDER, FOR SOLUTION INTRAMUSCULAR; INTRAVENOUS at 14:27

## 2023-10-17 RX ADMIN — SODIUM CHLORIDE 100 ML: 9 INJECTION, SOLUTION INTRAVENOUS at 14:58

## 2023-10-17 RX ADMIN — KETOROLAC TROMETHAMINE 15 MG: 15 INJECTION, SOLUTION INTRAMUSCULAR; INTRAVENOUS at 16:22

## 2023-10-17 RX ADMIN — IOPAMIDOL 76 ML: 755 INJECTION, SOLUTION INTRAVENOUS at 14:58

## 2023-10-17 ASSESSMENT — ACTIVITIES OF DAILY LIVING (ADL)
ADLS_ACUITY_SCORE: 35
ADLS_ACUITY_SCORE: 35

## 2023-10-17 NOTE — ED PROVIDER NOTES
History     Chief Complaint:  Shortness of Breath       HPI   Adin Meade is a 28 year old male, current cigarette smoker, who presents with 2 weeks worth of cough with diaphoresis and chest discomfort.  He is profusely sweating on his arrival here today.  He does not have a history of COPD or asthma per his report.  He is uncertain denies been having fevers but he has been again diaphoretic.  He is extremely short of breath.  No recent travel.  No crushing chest pressure, abdominal pain, nausea, vomiting, or diarrhea.  No recent travel.  No history of acute coronary syndrome, PE or DVT      Independent Historian:    The patient    Review of External Notes:  None    Medications:    acetaminophen (TYLENOL) 325 MG tablet            Past Medical History:    Past Medical History:   Diagnosis Date    Obesity     Pilonidal cyst        Past Surgical History:    Past Surgical History:   Procedure Laterality Date    NO HISTORY OF SURGERY            Physical Exam   Patient Vitals for the past 24 hrs:   BP Temp Temp src Pulse Resp SpO2 Weight   10/17/23 1415 136/69 -- -- -- 16 95 % --   10/17/23 1413 -- -- -- -- -- 95 % --   10/17/23 1047 (!) 160/78 -- -- -- -- -- --   10/17/23 1046 -- 98.3  F (36.8  C) Oral -- -- -- --   10/17/23 1042 (!) 178/91 (!) 96.5  F (35.8  C) Temporal 93 20 93 % (!) 188.2 kg (415 lb)        Physical Exam  Constitutional: Vital signs reviewed as above  General: Alert, diaphoretic  HEENT: Moist mucous membranes  Eyes: Pupils are equal, round, and reactive to light.   Neck: Normal range of motion  Cardiovascular: Regular rate, Regular rhythm and normal heart sounds.  No MRG  Pulmonary/Chest: Mild tachypnea, diffuse expiratory wheezing with diminished airflow, no stridor or rhonchi.  No accessory muscle usage.  Gastrointestinal: Soft. Positive bowel sounds. No MRG.  Musculoskeletal/Extremities: Full ROM.  Endo: No pitting edema  Neurological: Alert, no focal deficits.  Skin: Skin is warm and  diaphoretic  Psychiatric: Pleasant      Emergency Department Course   ECG  Normal sinus rhythm, rate of 85, no acute concerning ST or T wave changes.    Imaging:  CT Chest Pulmonary Embolism w Contrast   Final Result   IMPRESSION:   1.  No evidence for pulmonary embolism.   2.  Patchy tree-in-bud opacities in the left lower lobe are   nonspecific, but may be infectious in etiology.   3.  A few mildly prominent and borderline enlarged bilateral hilar   lymph nodes have decreased slightly in prominence since the previous   exam.   4.  Indeterminate 0.4 cm right upper lobe pulmonary nodule. Please   refer to follow-up guidelines below.      Recommendations for one or multiple incidental lung nodules < 6mm:     Low risk patients: No routine follow-up.     High risk patients: Optional follow-up CT at 12 months; if   unchanged, no further follow-up.      *Low Risk: Minimal or absent history of smoking or other known risk   factors.   *Nonsolid (ground glass) or partly solid nodules may require longer   follow-up to exclude indolent adenocarcinoma.   *Recommendations based on Guidelines for the Management of Incidental   Pulmonary Nodules Detected at CT: From the Fleischner Society 2017,   Radiology 2017.   *Guidelines apply to incidental nodules in patients who are 35 years   or older.   *Guidelines do not apply to lung cancer screening, patients with   immunosuppression, or patients with known primary cancer.      DUSTY QUINTANILLA MD            SYSTEM ID:  [id number]        Report per radiology    Laboratory:  Labs Ordered and Resulted from Time of ED Arrival to Time of ED Departure   COMPREHENSIVE METABOLIC PANEL - Abnormal       Result Value    Sodium 141      Potassium 4.3      Carbon Dioxide (CO2) 23      Anion Gap 12      Urea Nitrogen 12.4      Creatinine 0.77      GFR Estimate >90      Calcium 8.9      Chloride 106      Glucose 145 (*)     Alkaline Phosphatase 65      AST 25      ALT 31      Protein Total 7.5       Albumin 4.2      Bilirubin Total 0.2     INFLUENZA A/B, RSV, & SARS-COV2 PCR - Normal    Influenza A PCR Negative      Influenza B PCR Negative      RSV PCR Negative      SARS CoV2 PCR Negative     TROPONIN T, HIGH SENSITIVITY - Normal    Troponin T, High Sensitivity 7     CBC WITH PLATELETS AND DIFFERENTIAL    WBC Count 9.7      RBC Count 5.31      Hemoglobin 14.9      Hematocrit 46.1      MCV 87      MCH 28.1      MCHC 32.3      RDW 13.2      Platelet Count 235      % Neutrophils 66      % Lymphocytes 26      % Monocytes 6      Mids % (Monos, Eos, Basos)        % Eosinophils 1      % Basophils 0      % Immature Granulocytes 1      NRBCs per 100 WBC 0      Absolute Neutrophils 6.5      Absolute Lymphocytes 2.5      Absolute Monocytes 0.5      Mids Abs (Monos, Eos, Basos)        Absolute Eosinophils 0.1      Absolute Basophils 0.0      Absolute Immature Granulocytes 0.1      Absolute NRBCs 0.0          Emergency Department Course & Assessments:        Interventions:  Medications   cefTRIAXone (ROCEPHIN) 2 g vial to attach to  ml bag for ADULTS or NS 50 ml bag for PEDS (2 g Intravenous $New Bag 10/17/23 1618)   azithromycin (ZITHROMAX) 500 mg vial to attach to  mL bag (has no administration in time range)   sodium chloride 0.9% BOLUS 1,000 mL (1,000 mLs Intravenous $New Bag 10/17/23 1425)   ipratropium - albuterol 0.5 mg/2.5 mg/3 mL (DUONEB) neb solution 3 mL (3 mLs Nebulization $Given 10/17/23 1429)   methylPREDNISolone sodium succinate (solu-MEDROL) injection 125 mg (125 mg Intravenous $Given 10/17/23 1427)   Saline Flush - CT (100 mLs Intravenous $Given 10/17/23 1458)   iopamidol (ISOVUE-370) solution 76 mL (76 mLs Intravenous $Given 10/17/23 1458)   ketorolac (TORADOL) injection 15 mg (15 mg Intravenous $Given 10/17/23 1622)        Assessments:  Community-acquired    Independent Interpretation (X-rays, CTs, rhythm strip):  CT scan of the chest was negative for PE but does show infiltrate consistent  with pneumonia per my interpretation    Consultations/Discussion of Management or Tests:  None       Social Determinants of Health affecting care:  Patient is a cigarette smoker     Disposition:  The patient was discharged to home.     Impression & Plan        Medical Decision Making:  Patient presents to the emergency room with above-mentioned complaints.  He has an obvious upper respiratory type syndrome and has been hypoxic with a cough.  He is also over 400 pounds and there is a pleuritic component to his pain.  He has not had a recent travel but did get a PE study.  [facility] this was negative for PE but does show pneumonia.  He does have a leukocytosis which is consistent with pneumonia.  He was given Rocephin and azithromycin here.  He is also given Toradol, Solu-Medrol, and a DuoNeb and was feeling better.  We discussed smoking cessation.  I will discharge him home with Vantin and azithromycin.  He will also be given an albuterol MDI as well as a prednisone burst and Tessalon Perles for his cough.  Reasons to return the emergency were discussed.    Diagnosis:    ICD-10-CM    1. Community acquired pneumonia, unspecified laterality  J18.9       2. Tobacco use disorder  F17.200            Discharge Medications:  New Prescriptions    ALBUTEROL (PROAIR HFA/PROVENTIL HFA/VENTOLIN HFA) 108 (90 BASE) MCG/ACT INHALER    Inhale 2 puffs into the lungs every 6 hours as needed for shortness of breath, wheezing or cough    AZITHROMYCIN (ZITHROMAX Z-SHELLI) 250 MG TABLET    Two tablets on the first day, then one tablet daily for the next 4 days    BENZONATATE (TESSALON) 100 MG CAPSULE    Take 2 capsules (200 mg) by mouth 3 times daily as needed for cough    CEFPODOXIME (VANTIN) 200 MG TABLET    Take 1 tablet (200 mg) by mouth 2 times daily for 7 days    PREDNISONE (DELTASONE) 20 MG TABLET    Take two tablets (= 40mg) each day for 5 (five) days          Carlos Regan MD  10/17/2023   Carlos Regan MD               Carlos Regan MD  10/17/23 1500

## 2023-10-17 NOTE — ED TRIAGE NOTES
Sick with UR cold for the past 2 weeks, his O2 sats are in low 90s, he is unable to move from one place to the next without resting, he also is SOB with talking.  Increase WOB with talking and walking.

## 2023-10-21 ENCOUNTER — NURSE TRIAGE (OUTPATIENT)
Dept: NURSING | Facility: CLINIC | Age: 28
End: 2023-10-21
Payer: COMMERCIAL

## 2023-10-21 NOTE — TELEPHONE ENCOUNTER
Patient took a 500 mg methocarbamol and a vicodin together for pain. Patient is calling to see if there is any harm in that combination. UpToDate drug interaction completed.    Patient is home with his daughter and it is recommended that patient have her check on him frequently to make sure that he hasn't become overly drowsy.  It is also recommended that patient not take this combination of medications anymore. Patient verbalized understanding of care advice.        Freda Baez RN on 10/21/2023 at 5:57 PM    Reason for Disposition    Caller has medicine question, adult has minor symptoms, caller declines triage, AND triager answers question    Protocols used: Medication Question Call-A-

## 2024-06-29 ENCOUNTER — HEALTH MAINTENANCE LETTER (OUTPATIENT)
Age: 29
End: 2024-06-29

## 2024-09-20 ENCOUNTER — VIRTUAL VISIT (OUTPATIENT)
Dept: URGENT CARE | Facility: CLINIC | Age: 29
End: 2024-09-20
Payer: COMMERCIAL

## 2024-09-20 DIAGNOSIS — R05.1 ACUTE COUGH: ICD-10-CM

## 2024-09-20 DIAGNOSIS — J02.9 SORE THROAT: Primary | ICD-10-CM

## 2024-09-20 PROCEDURE — 99213 OFFICE O/P EST LOW 20 MIN: CPT | Mod: 95 | Performed by: NURSE PRACTITIONER

## 2024-09-20 NOTE — PROGRESS NOTES
"Adin is a 29 year old who is being evaluated via a billable video visit.    How would you like to obtain your AVS? MyChart  If the video visit is dropped, the invitation should be resent by: Text to cell phone: [phone]  Will anyone else be joining your video visit? No      Assessment & Plan   Problem List Items Addressed This Visit    None  Visit Diagnoses       Sore throat    -  Primary    Relevant Orders    Influenza A & B Antigen - Clinic Collect    Acute cough               Patient reports sore throat, painful swelling, upset stomach, cough without fever though has had \"hot and cold one night\" which began three days ago.  Negative covid 19.      Will test for influenza and strep and treat accordingly.  Symptom relief reviewed with patient.  Patient advised if symptoms persist, worsen or new symptoms arise they are to seek medical care.  All patients questions addressed. Patient verbalized understanding and agreement with plan.          Nicotine/Tobacco Cessation  He reports that he has been smoking. He has never used smokeless tobacco.  Nicotine/Tobacco Cessation Plan            No follow-ups on file.      Subjective   Adin is a 29 year old, presenting for the following health issues:  No chief complaint on file.         No data to display                Video Start Time:  8:09 am     HPI             Review of Systems  Constitutional, HEENT, cardiovascular, pulmonary, GI, , musculoskeletal, neuro, skin, endocrine and psych systems are negative, except as otherwise noted.      Objective           Vitals:  No vitals were obtained today due to virtual visit.    Physical Exam   GENERAL: alert and no distress  EYES: Eyes grossly normal to inspection.  No discharge or erythema, or obvious scleral/conjunctival abnormalities.  RESP: No audible wheeze, cough, or visible cyanosis.    SKIN: Visible skin clear. No significant rash, abnormal pigmentation or lesions.  NEURO: Cranial nerves grossly intact.  Mentation " and speech appropriate for age.  PSYCH: Appropriate affect, tone, and pace of words          Video-Visit Details    Type of service:  Video Visit   Video End Time:8:16 AM  Originating Location (pt. Location): Home    Distant Location (provider location):  Off-site  Platform used for Video Visit: Leonardo  Signed Electronically by: Verito Garcia NP

## 2025-07-13 ENCOUNTER — HEALTH MAINTENANCE LETTER (OUTPATIENT)
Age: 30
End: 2025-07-13